# Patient Record
Sex: FEMALE | Race: WHITE | NOT HISPANIC OR LATINO
[De-identification: names, ages, dates, MRNs, and addresses within clinical notes are randomized per-mention and may not be internally consistent; named-entity substitution may affect disease eponyms.]

---

## 2017-05-09 PROBLEM — Z00.00 ENCOUNTER FOR PREVENTIVE HEALTH EXAMINATION: Status: ACTIVE | Noted: 2017-05-09

## 2017-05-10 ENCOUNTER — APPOINTMENT (OUTPATIENT)
Dept: INTERNAL MEDICINE | Facility: CLINIC | Age: 72
End: 2017-05-10

## 2017-05-17 ENCOUNTER — APPOINTMENT (OUTPATIENT)
Dept: INTERNAL MEDICINE | Facility: CLINIC | Age: 72
End: 2017-05-17

## 2017-05-17 ENCOUNTER — NON-APPOINTMENT (OUTPATIENT)
Age: 72
End: 2017-05-17

## 2017-05-17 VITALS
TEMPERATURE: 98.6 F | BODY MASS INDEX: 18.14 KG/M2 | HEIGHT: 62 IN | SYSTOLIC BLOOD PRESSURE: 138 MMHG | HEART RATE: 106 BPM | DIASTOLIC BLOOD PRESSURE: 86 MMHG | OXYGEN SATURATION: 95 % | WEIGHT: 98.6 LBS | RESPIRATION RATE: 18 BRPM

## 2017-05-30 ENCOUNTER — OTHER (OUTPATIENT)
Age: 72
End: 2017-05-30

## 2017-05-30 LAB
CHOLEST SERPL-MCNC: 203
HBA1C MFR BLD HPLC: 6
HDLC SERPL-MCNC: 72
LDLC SERPL CALC-MCNC: 115

## 2017-06-06 ENCOUNTER — APPOINTMENT (OUTPATIENT)
Dept: INTERNAL MEDICINE | Facility: CLINIC | Age: 72
End: 2017-06-06

## 2017-06-06 VITALS
RESPIRATION RATE: 20 BRPM | DIASTOLIC BLOOD PRESSURE: 76 MMHG | BODY MASS INDEX: 23 KG/M2 | OXYGEN SATURATION: 95 % | HEIGHT: 62 IN | SYSTOLIC BLOOD PRESSURE: 122 MMHG | WEIGHT: 125 LBS | HEART RATE: 108 BPM | TEMPERATURE: 98.9 F

## 2017-06-06 DIAGNOSIS — R06.09 OTHER FORMS OF DYSPNEA: ICD-10-CM

## 2017-08-14 ENCOUNTER — RECORD ABSTRACTING (OUTPATIENT)
Age: 72
End: 2017-08-14

## 2017-08-14 ENCOUNTER — APPOINTMENT (OUTPATIENT)
Dept: INTERNAL MEDICINE | Facility: CLINIC | Age: 72
End: 2017-08-14
Payer: MEDICARE

## 2017-08-14 VITALS
HEIGHT: 63 IN | BODY MASS INDEX: 21.97 KG/M2 | WEIGHT: 124 LBS | OXYGEN SATURATION: 94 % | HEART RATE: 88 BPM | RESPIRATION RATE: 16 BRPM | TEMPERATURE: 98.1 F | DIASTOLIC BLOOD PRESSURE: 80 MMHG | SYSTOLIC BLOOD PRESSURE: 112 MMHG

## 2017-08-14 DIAGNOSIS — F17.200 NICOTINE DEPENDENCE, UNSPECIFIED, UNCOMPLICATED: ICD-10-CM

## 2017-08-14 DIAGNOSIS — J18.9 PNEUMONIA, UNSPECIFIED ORGANISM: ICD-10-CM

## 2017-08-14 DIAGNOSIS — I77.9 DISORDER OF ARTERIES AND ARTERIOLES, UNSPECIFIED: ICD-10-CM

## 2017-08-14 DIAGNOSIS — E78.00 PURE HYPERCHOLESTEROLEMIA, UNSPECIFIED: ICD-10-CM

## 2017-08-14 DIAGNOSIS — R05 COUGH: ICD-10-CM

## 2017-08-14 DIAGNOSIS — J44.9 CHRONIC OBSTRUCTIVE PULMONARY DISEASE, UNSPECIFIED: ICD-10-CM

## 2017-08-14 PROCEDURE — 99214 OFFICE O/P EST MOD 30 MIN: CPT

## 2017-08-17 ENCOUNTER — APPOINTMENT (OUTPATIENT)
Dept: INTERNAL MEDICINE | Facility: CLINIC | Age: 72
End: 2017-08-17
Payer: MEDICARE

## 2017-08-17 VITALS
TEMPERATURE: 98.3 F | HEIGHT: 62 IN | RESPIRATION RATE: 16 BRPM | DIASTOLIC BLOOD PRESSURE: 80 MMHG | BODY MASS INDEX: 23 KG/M2 | SYSTOLIC BLOOD PRESSURE: 112 MMHG | OXYGEN SATURATION: 95 % | HEART RATE: 74 BPM | WEIGHT: 125 LBS

## 2017-08-17 DIAGNOSIS — M81.0 AGE-RELATED OSTEOPOROSIS W/OUT CURRENT PATHOLOGICAL FRACTURE: ICD-10-CM

## 2017-08-17 DIAGNOSIS — L40.0 PSORIASIS VULGARIS: ICD-10-CM

## 2017-08-17 DIAGNOSIS — K59.00 CONSTIPATION, UNSPECIFIED: ICD-10-CM

## 2017-08-17 PROCEDURE — 99214 OFFICE O/P EST MOD 30 MIN: CPT

## 2017-08-19 ENCOUNTER — INPATIENT (INPATIENT)
Facility: HOSPITAL | Age: 72
LOS: 4 days | Discharge: NURSING, REHAB OR LONG-TERM CARE | End: 2017-08-24
Attending: GENERAL PRACTICE | Admitting: GENERAL PRACTICE
Payer: MEDICARE

## 2017-08-19 ENCOUNTER — EMERGENCY (EMERGENCY)
Facility: HOSPITAL | Age: 72
LOS: 0 days | Discharge: HOME | End: 2017-08-19
Attending: EMERGENCY MEDICINE | Admitting: EMERGENCY MEDICINE
Payer: MEDICARE

## 2017-08-19 VITALS — HEIGHT: 62 IN | WEIGHT: 125 LBS

## 2017-08-19 VITALS
RESPIRATION RATE: 18 BRPM | HEART RATE: 91 BPM | DIASTOLIC BLOOD PRESSURE: 72 MMHG | SYSTOLIC BLOOD PRESSURE: 119 MMHG | TEMPERATURE: 98 F | OXYGEN SATURATION: 97 %

## 2017-08-19 VITALS — WEIGHT: 125 LBS | HEIGHT: 62 IN

## 2017-08-19 DIAGNOSIS — K59.00 CONSTIPATION, UNSPECIFIED: ICD-10-CM

## 2017-08-19 DIAGNOSIS — L40.9 PSORIASIS, UNSPECIFIED: ICD-10-CM

## 2017-08-19 DIAGNOSIS — R10.9 UNSPECIFIED ABDOMINAL PAIN: ICD-10-CM

## 2017-08-19 PROCEDURE — 99284 EMERGENCY DEPT VISIT MOD MDM: CPT

## 2017-08-19 PROCEDURE — 74177 CT ABD & PELVIS W/CONTRAST: CPT | Mod: 26

## 2017-08-19 PROCEDURE — 99285 EMERGENCY DEPT VISIT HI MDM: CPT | Mod: 25

## 2017-08-19 PROCEDURE — 74022 RADEX COMPL AQT ABD SERIES: CPT | Mod: 26

## 2017-08-19 RX ORDER — PIPERACILLIN AND TAZOBACTAM 4; .5 G/20ML; G/20ML
3.38 INJECTION, POWDER, LYOPHILIZED, FOR SOLUTION INTRAVENOUS ONCE
Qty: 0 | Refills: 0 | Status: COMPLETED | OUTPATIENT
Start: 2017-08-19 | End: 2017-08-20

## 2017-08-19 RX ORDER — ONDANSETRON 8 MG/1
4 TABLET, FILM COATED ORAL ONCE
Qty: 0 | Refills: 0 | Status: COMPLETED | OUTPATIENT
Start: 2017-08-19 | End: 2017-08-19

## 2017-08-19 RX ORDER — SODIUM CHLORIDE 9 MG/ML
500 INJECTION INTRAMUSCULAR; INTRAVENOUS; SUBCUTANEOUS ONCE
Qty: 0 | Refills: 0 | Status: COMPLETED | OUTPATIENT
Start: 2017-08-19 | End: 2017-08-19

## 2017-08-19 RX ORDER — MULTIVIT WITH MIN/MFOLATE/K2 340-15/3 G
300 POWDER (GRAM) ORAL ONCE
Qty: 0 | Refills: 0 | Status: COMPLETED | OUTPATIENT
Start: 2017-08-19 | End: 2017-08-19

## 2017-08-19 RX ADMIN — Medication 300 MILLILITER(S): at 11:35

## 2017-08-19 RX ADMIN — SODIUM CHLORIDE 500 MILLILITER(S): 9 INJECTION INTRAMUSCULAR; INTRAVENOUS; SUBCUTANEOUS at 18:15

## 2017-08-19 RX ADMIN — ONDANSETRON 4 MILLIGRAM(S): 8 TABLET, FILM COATED ORAL at 18:21

## 2017-08-19 NOTE — ED STATDOCS - OBJECTIVE STATEMENT
72F w/ no significant pmhx presents to ED c/o lack of BM for the last 7 days. Pt reports going to PMD who determined she is having constipation after rectal exam. Pt has no other complaints and denies SOB, CP, fever/chills, and HA. Denies hematuria, dysuria.

## 2017-08-19 NOTE — ED STATDOCS - ATTENDING CONTRIBUTION TO CARE
I, Will Rocha, performed the initial face to face bedside interview with this patient regarding history of present illness, review of symptoms and relevant past medical, social and family history.  I completed an independent physical examination.  I was the initial provider who evaluated this patient. I have signed out the follow up of any pending tests (i.e. labs, radiological studies) to the ACP.  I have communicated the patient’s plan of care and disposition with the ACP.  The history, relevant review of systems, past medical and surgical history, medical decision making, and physical examination was documented by the scribe in my presence and I attest to the accuracy of the documentation.

## 2017-08-19 NOTE — ED ADULT NURSE NOTE - OBJECTIVE STATEMENT
pt presents to ED with constipation. pt states last BM was 7 days ago. pt states she was seen and DC'ed from ED this morning. pt states she drank mag citrate, and then vomited. pt c.o nausea and mild lower abd cramping. afebrile. breathing si even and unlabored. a&ox3. will continue to monitor and reassess.

## 2017-08-19 NOTE — ED STATDOCS - CARE PLAN
Principal Discharge DX:	Metastatic colon cancer in female  Secondary Diagnosis:	Large bowel obstruction

## 2017-08-19 NOTE — ED PROVIDER NOTE - CARE PLAN
Principal Discharge DX:	Large bowel obstruction  Secondary Diagnosis:	Metastatic colon cancer in female

## 2017-08-19 NOTE — ED STATDOCS - PROGRESS NOTE DETAILS
TOSHA Boudreaux:  [facility]. She presented with c/o constipation for 6 days, tried OTC stool softners, went to see her PMD and was given fleet enema without any improvement.   I performed a CARRIE and there is no stool in the anal vault. Will check AXR.

## 2017-08-19 NOTE — ED STATDOCS - MEDICAL DECISION MAKING DETAILS
72F w/ no significant pmhx presents to ED c/o lack of BM for the last 7 days. Plan rectal exam and magnesium citrate.

## 2017-08-19 NOTE — ED ADULT NURSE REASSESSMENT NOTE - NS ED NURSE REASSESS COMMENT FT1
16FR NGT placed as ordered by Dr. Cabrera. placement checked and brownish drainage noted. Pt tolerated the procedure well. All needs are met and safety maintained. Will continue to monitor.

## 2017-08-19 NOTE — ED PROVIDER NOTE - OBJECTIVE STATEMENT
73 y/o female presents to the ED per MD if mag citrate made her vomit.  PT states her vomit was brown and has not eating in 2 days. No other presenting symptoms.

## 2017-08-19 NOTE — ED ADULT NURSE NOTE - CHPI ED SYMPTOMS NEG
no vomiting/no chills/no diarrhea/no dysuria/no fever/no blood in stool/no hematuria/no nausea/no burning urination/no abdominal distension

## 2017-08-19 NOTE — ED PROVIDER NOTE - PROGRESS NOTE DETAILS
TOSHA Boudreaux:  Pt seen and examined by intake provider. she was seen earlier for constipation but refused labs and CT at that time. she returned with vomiting and constipation and agreed to have labs and CT scan. Will follow.

## 2017-08-19 NOTE — ED ADULT TRIAGE NOTE - CHIEF COMPLAINT QUOTE
constipation, no BM x 7 days.  pt was seen here today and given mag citrate to drink, returns because she vomited after drinking mag citrate

## 2017-08-20 ENCOUNTER — TRANSCRIPTION ENCOUNTER (OUTPATIENT)
Age: 72
End: 2017-08-20

## 2017-08-20 ENCOUNTER — RESULT REVIEW (OUTPATIENT)
Age: 72
End: 2017-08-20

## 2017-08-20 DIAGNOSIS — C78.5 SECONDARY MALIGNANT NEOPLASM OF LARGE INTESTINE AND RECTUM: ICD-10-CM

## 2017-08-20 DIAGNOSIS — K56.60 UNSPECIFIED INTESTINAL OBSTRUCTION: ICD-10-CM

## 2017-08-20 LAB
ABO RH CONFIRMATION: SIGNIFICANT CHANGE UP
ADD ON TEST-SPECIMEN IN LAB: SIGNIFICANT CHANGE UP
ALBUMIN SERPL ELPH-MCNC: 3.7 G/DL — SIGNIFICANT CHANGE UP (ref 3.3–5)
ALP SERPL-CCNC: 142 U/L — HIGH (ref 40–120)
ALT FLD-CCNC: 28 U/L — SIGNIFICANT CHANGE UP (ref 12–78)
ANION GAP SERPL CALC-SCNC: 12 MMOL/L — SIGNIFICANT CHANGE UP (ref 5–17)
AST SERPL-CCNC: 24 U/L — SIGNIFICANT CHANGE UP (ref 15–37)
BASOPHILS # BLD AUTO: 0 K/UL — SIGNIFICANT CHANGE UP (ref 0–0.2)
BASOPHILS NFR BLD AUTO: 0.2 % — SIGNIFICANT CHANGE UP (ref 0–2)
BILIRUB SERPL-MCNC: 0.7 MG/DL — SIGNIFICANT CHANGE UP (ref 0.2–1.2)
BLD GP AB SCN SERPL QL: SIGNIFICANT CHANGE UP
BUN SERPL-MCNC: 27 MG/DL — HIGH (ref 7–23)
CALCIUM SERPL-MCNC: 9.6 MG/DL — SIGNIFICANT CHANGE UP (ref 8.5–10.1)
CHLORIDE SERPL-SCNC: 98 MMOL/L — SIGNIFICANT CHANGE UP (ref 96–108)
CO2 SERPL-SCNC: 25 MMOL/L — SIGNIFICANT CHANGE UP (ref 22–31)
CREAT SERPL-MCNC: 0.76 MG/DL — SIGNIFICANT CHANGE UP (ref 0.5–1.3)
EOSINOPHIL # BLD AUTO: 0 K/UL — SIGNIFICANT CHANGE UP (ref 0–0.5)
EOSINOPHIL NFR BLD AUTO: 0.1 % — SIGNIFICANT CHANGE UP (ref 0–6)
GLUCOSE SERPL-MCNC: 134 MG/DL — HIGH (ref 70–99)
HCT VFR BLD CALC: 48.2 % — HIGH (ref 34.5–45)
HGB BLD-MCNC: 16.4 G/DL — HIGH (ref 11.5–15.5)
INR BLD: 1.09 RATIO — SIGNIFICANT CHANGE UP (ref 0.88–1.16)
LIDOCAIN IGE QN: 141 U/L — SIGNIFICANT CHANGE UP (ref 73–393)
LYMPHOCYTES # BLD AUTO: 1.1 K/UL — SIGNIFICANT CHANGE UP (ref 1–3.3)
LYMPHOCYTES # BLD AUTO: 10.8 % — LOW (ref 13–44)
MAGNESIUM SERPL-MCNC: 2.8 MG/DL — HIGH (ref 1.6–2.6)
MCHC RBC-ENTMCNC: 31.2 PG — SIGNIFICANT CHANGE UP (ref 27–34)
MCHC RBC-ENTMCNC: 34.1 GM/DL — SIGNIFICANT CHANGE UP (ref 32–36)
MCV RBC AUTO: 91.7 FL — SIGNIFICANT CHANGE UP (ref 80–100)
MONOCYTES # BLD AUTO: 0.5 K/UL — SIGNIFICANT CHANGE UP (ref 0–0.9)
MONOCYTES NFR BLD AUTO: 4.9 % — SIGNIFICANT CHANGE UP (ref 2–14)
NEUTROPHILS # BLD AUTO: 8.9 K/UL — HIGH (ref 1.8–7.4)
NEUTROPHILS NFR BLD AUTO: 84.1 % — HIGH (ref 43–77)
PLATELET # BLD AUTO: 258 K/UL — SIGNIFICANT CHANGE UP (ref 150–400)
POTASSIUM SERPL-MCNC: 4.3 MMOL/L — SIGNIFICANT CHANGE UP (ref 3.5–5.3)
POTASSIUM SERPL-SCNC: 4.3 MMOL/L — SIGNIFICANT CHANGE UP (ref 3.5–5.3)
PROT SERPL-MCNC: 7.5 GM/DL — SIGNIFICANT CHANGE UP (ref 6–8.3)
PROTHROM AB SERPL-ACNC: 11.8 SEC — SIGNIFICANT CHANGE UP (ref 9.8–12.7)
RBC # BLD: 5.26 M/UL — HIGH (ref 3.8–5.2)
RBC # FLD: 11.7 % — SIGNIFICANT CHANGE UP (ref 10.3–14.5)
SODIUM SERPL-SCNC: 135 MMOL/L — SIGNIFICANT CHANGE UP (ref 135–145)
TYPE + AB SCN PNL BLD: SIGNIFICANT CHANGE UP
WBC # BLD: 10.6 K/UL — HIGH (ref 3.8–10.5)
WBC # FLD AUTO: 10.6 K/UL — HIGH (ref 3.8–10.5)

## 2017-08-20 PROCEDURE — 88341 IMHCHEM/IMCYTCHM EA ADD ANTB: CPT | Mod: 26

## 2017-08-20 PROCEDURE — 88342 IMHCHEM/IMCYTCHM 1ST ANTB: CPT | Mod: 26

## 2017-08-20 PROCEDURE — 93010 ELECTROCARDIOGRAM REPORT: CPT

## 2017-08-20 PROCEDURE — 88307 TISSUE EXAM BY PATHOLOGIST: CPT | Mod: 26

## 2017-08-20 RX ORDER — HYDROMORPHONE HYDROCHLORIDE 2 MG/ML
0.5 INJECTION INTRAMUSCULAR; INTRAVENOUS; SUBCUTANEOUS
Qty: 0 | Refills: 0 | Status: DISCONTINUED | OUTPATIENT
Start: 2017-08-20 | End: 2017-08-21

## 2017-08-20 RX ORDER — ENOXAPARIN SODIUM 100 MG/ML
40 INJECTION SUBCUTANEOUS EVERY 24 HOURS
Qty: 0 | Refills: 0 | Status: DISCONTINUED | OUTPATIENT
Start: 2017-08-20 | End: 2017-08-24

## 2017-08-20 RX ORDER — SODIUM CHLORIDE 9 MG/ML
1000 INJECTION INTRAMUSCULAR; INTRAVENOUS; SUBCUTANEOUS
Qty: 0 | Refills: 0 | Status: DISCONTINUED | OUTPATIENT
Start: 2017-08-20 | End: 2017-08-21

## 2017-08-20 RX ORDER — ONDANSETRON 8 MG/1
4 TABLET, FILM COATED ORAL EVERY 6 HOURS
Qty: 0 | Refills: 0 | Status: DISCONTINUED | OUTPATIENT
Start: 2017-08-20 | End: 2017-08-24

## 2017-08-20 RX ORDER — CEFOTETAN DISODIUM 1 G
2 VIAL (EA) INJECTION ONCE
Qty: 0 | Refills: 0 | Status: DISCONTINUED | OUTPATIENT
Start: 2017-08-20 | End: 2017-08-20

## 2017-08-20 RX ORDER — SODIUM CHLORIDE 9 MG/ML
1000 INJECTION INTRAMUSCULAR; INTRAVENOUS; SUBCUTANEOUS
Qty: 0 | Refills: 0 | Status: DISCONTINUED | OUTPATIENT
Start: 2017-08-20 | End: 2017-08-20

## 2017-08-20 RX ORDER — MEPERIDINE HYDROCHLORIDE 50 MG/ML
12.5 INJECTION INTRAMUSCULAR; INTRAVENOUS; SUBCUTANEOUS
Qty: 0 | Refills: 0 | Status: DISCONTINUED | OUTPATIENT
Start: 2017-08-20 | End: 2017-08-20

## 2017-08-20 RX ORDER — CEFOTETAN DISODIUM 1 G
2 VIAL (EA) INJECTION EVERY 12 HOURS
Qty: 0 | Refills: 0 | Status: COMPLETED | OUTPATIENT
Start: 2017-08-20 | End: 2017-08-20

## 2017-08-20 RX ORDER — ONDANSETRON 8 MG/1
4 TABLET, FILM COATED ORAL ONCE
Qty: 0 | Refills: 0 | Status: DISCONTINUED | OUTPATIENT
Start: 2017-08-20 | End: 2017-08-20

## 2017-08-20 RX ORDER — NALOXONE HYDROCHLORIDE 4 MG/.1ML
0.1 SPRAY NASAL
Qty: 0 | Refills: 0 | Status: DISCONTINUED | OUTPATIENT
Start: 2017-08-20 | End: 2017-08-24

## 2017-08-20 RX ORDER — MORPHINE SULFATE 50 MG/1
4 CAPSULE, EXTENDED RELEASE ORAL EVERY 4 HOURS
Qty: 0 | Refills: 0 | Status: DISCONTINUED | OUTPATIENT
Start: 2017-08-20 | End: 2017-08-24

## 2017-08-20 RX ORDER — NICOTINE POLACRILEX 2 MG
1 GUM BUCCAL DAILY
Qty: 0 | Refills: 0 | Status: DISCONTINUED | OUTPATIENT
Start: 2017-08-20 | End: 2017-08-20

## 2017-08-20 RX ORDER — HYDROMORPHONE HYDROCHLORIDE 2 MG/ML
30 INJECTION INTRAMUSCULAR; INTRAVENOUS; SUBCUTANEOUS
Qty: 0 | Refills: 0 | Status: DISCONTINUED | OUTPATIENT
Start: 2017-08-20 | End: 2017-08-21

## 2017-08-20 RX ADMIN — Medication 100 GRAM(S): at 18:53

## 2017-08-20 RX ADMIN — Medication 100 GRAM(S): at 11:34

## 2017-08-20 RX ADMIN — HYDROMORPHONE HYDROCHLORIDE 30 MILLILITER(S): 2 INJECTION INTRAMUSCULAR; INTRAVENOUS; SUBCUTANEOUS at 05:09

## 2017-08-20 RX ADMIN — ENOXAPARIN SODIUM 40 MILLIGRAM(S): 100 INJECTION SUBCUTANEOUS at 11:35

## 2017-08-20 RX ADMIN — PIPERACILLIN AND TAZOBACTAM 200 GRAM(S): 4; .5 INJECTION, POWDER, LYOPHILIZED, FOR SOLUTION INTRAVENOUS at 01:31

## 2017-08-20 RX ADMIN — SODIUM CHLORIDE 100 MILLILITER(S): 9 INJECTION INTRAMUSCULAR; INTRAVENOUS; SUBCUTANEOUS at 05:10

## 2017-08-20 NOTE — DISCHARGE NOTE ADULT - HOSPITAL COURSE
73 yo fem admitted with obstructed splenic flexure mass with liver metastasis, patient underwent an end colostomy with mucous fistula, liver mass biopsy, did well after surgery

## 2017-08-20 NOTE — H&P ADULT - NSHPREVIEWOFSYSTEMS_GEN_ALL_CORE
REVIEW OF SYSTEMS:    CONSTITUTIONAL: No weakness, fevers or chills  EYES/ENT: No visual changes;  No vertigo or throat pain   NECK: No pain or stiffness  RESPIRATORY: No cough, wheezing, hemoptysis; No shortness of breath  CARDIOVASCULAR: No chest pain or palpitations  GASTROINTESTINAL: abdominal  pain. nausea, vomiting, s; No diarrhea or constipation. No melena or hematochezia.obstipation  GENITOURINARY: No dysuria, frequency or hematuria  NEUROLOGICAL: No numbness or weakness  SKIN: No itching, burning, rashes, or lesions   All other review of systems is negative unless indicated above.

## 2017-08-20 NOTE — DISCHARGE NOTE ADULT - PLAN OF CARE
Return to normal activities in 2-3 weeks Follow up appt 8/29, 9:30 am. Blue house in corner, parking in rear. Follow up appt Dr Sneed 8/29, 9:30 am. Blue house in corner, parking in rear.    Call DR Desouza to make appt in 2 weeks

## 2017-08-20 NOTE — BRIEF OPERATIVE NOTE - PROCEDURE
Colostomy, end transverse  08/20/2017  end transverse colostomy, mucuos fistula, liver mass  biopsy  Active  YING

## 2017-08-20 NOTE — CONSULT NOTE ADULT - SUBJECTIVE AND OBJECTIVE BOX
Patient is a 72y old  Female who presents with a chief complaint of Abd pain and was found to have colonic obstruction due to a splenic flexure mass      HPI:  73 yo fem with abdominal distention, no Bms x 7 days, patient came earlier to ER was given laxative and was sent home. Patient returned vomiting. CT Abd showed obstructive splenic flexure mass with multiple liver mets. Has never had a colonoscopy  she is now s/p a diverting colostomy.    liver biopsy taken     the primary lesion in the splenic flexure was fixed and could not be removed.      PAST MEDICAL & SURGICAL HISTORY:  Psoriasis  No significant past surgical history      REVIEW OF SYSTEMS    General:	The patient is post op    no unexpected weight loss. Appetite good. no night sweats.  Skin: no Rash.	  ENT: no sore throat or oral pain. no difficulty with swallowing  Respiratory: No chest pain, No shortness of breath, no hemoptysis, no cough, no chest pain.  Cardiovascular : No chest pain. no palpitation.  Gastrointestinal:  post op pain  Genitourinary: No hematuria , no dysuria	  Musculoskeletal: No myalgia, no arthralgia, no back pain, no joint swelling  Neurological: no headaches, no dizzyness, no weakness, no double vision. 	  Psychiatric: no change in mood. 	  Hematology/Lymphatics: no weakness. 	  Endocrine:	no burning in urine or frequency  Allergic/Immunologic:	 no fever.     Allergies    No Known Allergies    Intolerances        Occupation:  [employer]: Denied Smoking: Denied Drug Use: Denied  Marital Status:         FAMILY HISTORY:      MEDICATIONS  (STANDING):  HYDROmorphone PCA (1 mG/mL) 30 milliLiter(s) PCA Continuous PCA Continuous  enoxaparin Injectable 40 milliGRAM(s) SubCutaneous every 24 hours  sodium chloride 0.9%. 1000 milliLiter(s) (100 mL/Hr) IV Continuous <Continuous>  cefoTEtan  IVPB 2 Gram(s) IV Intermittent every 12 hours    MEDICATIONS  (PRN):  HYDROmorphone PCA (1 mG/mL) Rescue Clinician Bolus 0.5 milliGRAM(s) IV Push every 15 minutes PRN for Pain Scale GREATER THAN 6  naloxone Injectable 0.1 milliGRAM(s) IV Push every 3 minutes PRN For ANY of the following changes in patient status:  A. RR LESS THAN 10 breaths per minute, B. Oxygen saturation LESS THAN 90%, C. Sedation score of 6  ondansetron Injectable 4 milliGRAM(s) IV Push every 6 hours PRN Nausea  ondansetron Injectable 4 milliGRAM(s) IV Push every 6 hours PRN Nausea  morphine  - Injectable 4 milliGRAM(s) IV Push every 4 hours PRN Moderate Pain (4 - 6)      PHYSICAL EXAM:  Vital Signs Last 24 Hrs  T(C): 36.3 (20 Aug 2017 05:57), Max: 36.8 (19 Aug 2017 17:32)  T(F): 97.3 (20 Aug 2017 05:57), Max: 98.2 (19 Aug 2017 17:32)  HR: 67 (20 Aug 2017 05:57) (60 - 97)  BP: 110/68 (20 Aug 2017 05:57) (105/63 - 134/79)  BP(mean): --  RR: 12 (20 Aug 2017 05:57) (11 - 17)  SpO2: 98% (20 Aug 2017 05:57) (98% - 100%)  Gen: well developed, well nourished, comfortable  HEENT: normocephalic/atraumatic, no conjunctival pallor, no scleral icterus, no oral thrush/mucosal bleeding/mucositis  Neck: supple, no masses, no JVD  Breasts: deferred  Back: nontender  Cardiovascular: heart shounds Normal S1S2, no murmurs/rubs/gallops  Respiratory: air entry normal and equal on both sides. no wheeze, no ronchi,   Gastrointestinal: BS+, soft, NT/ND, no masses, no splenomegaly, no hepatomegaly,   no evidence for ascites  Genitourinary: deferred  Rectal: deferred  Extremities: no clubbing/cyanosis, no edema, no calf tenderness  Neurological:  Alert oriented X3 cranial nerves normal. no focal deficits  Skin: no rash on visible skin  Lymph Nodes:  no cervical/supraclavicular LAD, no axillary/groin LAD  Musculoskeletal:  full ROM  Psychiatric:  mood appears normal. not obviously anxious or depressed.        LABS:                        16.4   10.6  )-----------( 258      ( 19 Aug 2017 18:15 )             48.2     19 Aug 2017 18:15    135    |  98     |  27     ----------------------------<  134    4.3     |  25     |  0.76     Ca    9.6        19 Aug 2017 18:15  Mg     2.8       19 Aug 2017 18:15    TPro  7.5    /  Alb  3.7    /  TBili  0.7    /  DBili  x      /  AST  24     /  ALT  28     /  AlkPhos  142    19 Aug 2017 18:15    LIVER FUNCTIONS - ( 19 Aug 2017 18:15 )  Alb: 3.7 g/dL / Pro: 7.5 gm/dL / ALK PHOS: 142 U/L / ALT: 28 U/L / AST: 24 U/L / GGT: x           PT/INR - ( 19 Aug 2017 18:15 )   PT: 11.8 sec;   INR: 1.09 ratio             RADIOLOGY & ADDITIONAL STUDIES: Patient is a 72y old  Female who presents with a chief complaint of Abd pain and was found to have colonic obstruction due to a splenic flexure mass      HPI:  71 yo fem with abdominal distention, no Bms x 7 days, patient came earlier to ER was given laxative and was sent home. Patient returned vomiting. CT Abd showed obstructive splenic flexure mass with multiple liver mets. Has never had a colonoscopy  she is now s/p a diverting colostomy.    liver biopsy taken     the primary lesion in the splenic flexure was fixed and could not be removed.      PAST MEDICAL & SURGICAL HISTORY:  Psoriasis  No significant past surgical history      REVIEW OF SYSTEMS    General:	The patient is post op  appears tired  no unexpected weight loss.  Skin: no Rash.	  ENT: no sore throat or oral pain. no difficulty with swallowing  Respiratory: No chest pain, No shortness of breath, no hemoptysis, no cough, no chest pain.  Cardiovascular : No chest pain. no palpitation.  Gastrointestinal:  post op pain  Genitourinary: No hematuria , no dysuria	  Musculoskeletal: No myalgia, no arthralgia, no back pain, no joint swelling  Neurological: no headaches, no dizzyness,+weakness, no double vision. 	  Psychiatric: no change in mood. 	  Hematology/Lymphatics: no weakness. 	  Endocrine:	no burning in urine or frequency  Allergic/Immunologic:	 no fever.     Allergies    No Known Allergies    Intolerances        Occupation:  [employer]: Denied Smoking: Denied Drug Use: Denied  Marital Status:         FAMILY HISTORY:      MEDICATIONS  (STANDING):  HYDROmorphone PCA (1 mG/mL) 30 milliLiter(s) PCA Continuous PCA Continuous  enoxaparin Injectable 40 milliGRAM(s) SubCutaneous every 24 hours  sodium chloride 0.9%. 1000 milliLiter(s) (100 mL/Hr) IV Continuous <Continuous>  cefoTEtan  IVPB 2 Gram(s) IV Intermittent every 12 hours    MEDICATIONS  (PRN):  HYDROmorphone PCA (1 mG/mL) Rescue Clinician Bolus 0.5 milliGRAM(s) IV Push every 15 minutes PRN for Pain Scale GREATER THAN 6  naloxone Injectable 0.1 milliGRAM(s) IV Push every 3 minutes PRN For ANY of the following changes in patient status:  A. RR LESS THAN 10 breaths per minute, B. Oxygen saturation LESS THAN 90%, C. Sedation score of 6  ondansetron Injectable 4 milliGRAM(s) IV Push every 6 hours PRN Nausea  ondansetron Injectable 4 milliGRAM(s) IV Push every 6 hours PRN Nausea  morphine  - Injectable 4 milliGRAM(s) IV Push every 4 hours PRN Moderate Pain (4 - 6)      PHYSICAL EXAM:  Vital Signs Last 24 Hrs  T(C): 36.3 (20 Aug 2017 05:57), Max: 36.8 (19 Aug 2017 17:32)  T(F): 97.3 (20 Aug 2017 05:57), Max: 98.2 (19 Aug 2017 17:32)  HR: 67 (20 Aug 2017 05:57) (60 - 97)  BP: 110/68 (20 Aug 2017 05:57) (105/63 - 134/79)  BP(mean): --  RR: 12 (20 Aug 2017 05:57) (11 - 17)  SpO2: 98% (20 Aug 2017 05:57) (98% - 100%)      Gen: post op   uncomfortable  HEENT: normocephalic/atraumatic, no conjunctival pallor, no scleral icterus, no oral thrush/mucosal bleeding/mucositis  Neck: supple, no masses, no JVD  Breasts: deferred  Back: nontender  Cardiovascular: heart shounds Normal S1S2, no murmurs/rubs/gallops  Respiratory: air entry normal and equal on both sides. no wheeze, no ronchi,   Gastrointestinal: colostomy functioning well   no evidence for ascites  Genitourinary: deferred  Rectal: deferred  Extremities: no clubbing/cyanosis, no edema, no calf tenderness  Neurological:  Alert oriented X3 cranial nerves normal. no focal deficits  Skin: no rash on visible skin  Lymph Nodes:  no cervical/supraclavicular LAD, no axillary/groin LAD  Musculoskeletal:  full ROM  Psychiatric:  mood appears normal.+ anxious or depressed.        LABS:                        16.4   10.6  )-----------( 258      ( 19 Aug 2017 18:15 )             48.2     19 Aug 2017 18:15    135    |  98     |  27     ----------------------------<  134    4.3     |  25     |  0.76     Ca    9.6        19 Aug 2017 18:15  Mg     2.8       19 Aug 2017 18:15    TPro  7.5    /  Alb  3.7    /  TBili  0.7    /  DBili  x      /  AST  24     /  ALT  28     /  AlkPhos  142    19 Aug 2017 18:15    LIVER FUNCTIONS - ( 19 Aug 2017 18:15 )  Alb: 3.7 g/dL / Pro: 7.5 gm/dL / ALK PHOS: 142 U/L / ALT: 28 U/L / AST: 24 U/L / GGT: x           PT/INR - ( 19 Aug 2017 18:15 )   PT: 11.8 sec;   INR: 1.09 ratio      Path pending.      RADIOLOGY & ADDITIONAL STUDIES:    multiple lever mets in the liver involving both lobes. splenic implant  colonic obstruction Patient is a 72y old  Female who presents with a chief complaint of Abd pain and was found to have colonic obstruction due to a splenic flexure mass      HPI:  71 yo fem with abdominal distention, no Bms x 7 days, patient came earlier to ER was given laxative and was sent home. Patient returned vomiting. CT Abd showed obstructive splenic flexure mass with multiple liver mets. Has never had a colonoscopy  she is now s/p a diverting colostomy.    liver biopsy taken     the primary lesion in the splenic flexure was fixed and could not be removed.      PAST MEDICAL & SURGICAL HISTORY:  Psoriasis  No significant past surgical history      REVIEW OF SYSTEMS    General:	The patient is post op  appears tired  no unexpected weight loss.  Skin: no Rash.	  ENT: no sore throat or oral pain. no difficulty with swallowing  Respiratory: No chest pain, No shortness of breath, no hemoptysis, no cough, no chest pain.  Cardiovascular : No chest pain. no palpitation.  Gastrointestinal:  post op pain  Genitourinary: No hematuria , no dysuria	  Musculoskeletal: No myalgia, no arthralgia, no back pain, no joint swelling  Neurological: no headaches, no dizzyness,+weakness, no double vision. 	  Psychiatric: no change in mood. 	  Hematology/Lymphatics: no weakness. 	  Endocrine:	no burning in urine or frequency  Allergic/Immunologic:	 no fever.     Allergies    No Known Allergies    Intolerances        Occupation:  [employer]: Denied Smoking: Denied Drug Use: Denied  Marital Status:         FAMILY HISTORY:      MEDICATIONS  (STANDING):  HYDROmorphone PCA (1 mG/mL) 30 milliLiter(s) PCA Continuous PCA Continuous  enoxaparin Injectable 40 milliGRAM(s) SubCutaneous every 24 hours  sodium chloride 0.9%. 1000 milliLiter(s) (100 mL/Hr) IV Continuous <Continuous>  cefoTEtan  IVPB 2 Gram(s) IV Intermittent every 12 hours    MEDICATIONS  (PRN):  HYDROmorphone PCA (1 mG/mL) Rescue Clinician Bolus 0.5 milliGRAM(s) IV Push every 15 minutes PRN for Pain Scale GREATER THAN 6  naloxone Injectable 0.1 milliGRAM(s) IV Push every 3 minutes PRN For ANY of the following changes in patient status:  A. RR LESS THAN 10 breaths per minute, B. Oxygen saturation LESS THAN 90%, C. Sedation score of 6  ondansetron Injectable 4 milliGRAM(s) IV Push every 6 hours PRN Nausea  ondansetron Injectable 4 milliGRAM(s) IV Push every 6 hours PRN Nausea  morphine  - Injectable 4 milliGRAM(s) IV Push every 4 hours PRN Moderate Pain (4 - 6)      PHYSICAL EXAM:  Vital Signs Last 24 Hrs  T(C): 36.3 (20 Aug 2017 05:57), Max: 36.8 (19 Aug 2017 17:32)  T(F): 97.3 (20 Aug 2017 05:57), Max: 98.2 (19 Aug 2017 17:32)  HR: 67 (20 Aug 2017 05:57) (60 - 97)  BP: 110/68 (20 Aug 2017 05:57) (105/63 - 134/79)  BP(mean): --  RR: 12 (20 Aug 2017 05:57) (11 - 17)  SpO2: 98% (20 Aug 2017 05:57) (98% - 100%)      Gen: post op   comfortable  HEENT: normocephalic/atraumatic, no conjunctival pallor, no scleral icterus, no oral thrush/mucosal bleeding/mucositis  Neck: supple, no masses, no JVD  Breasts: deferred  Back: nontender  Cardiovascular: heart sounds Normal S1S2, no murmurs/rubs/gallops  Respiratory: air entry normal and equal on both sides. no wheeze, no ronchi,   Gastrointestinal: colostomy functioning well   no evidence for ascites  Genitourinary: deferred  Rectal: deferred  Extremities: no clubbing/cyanosis, no edema, no calf tenderness  Neurological:  Alert oriented X3 cranial nerves normal. no focal deficits  Skin: no rash on visible skin  Lymph Nodes:  no cervical/supraclavicular LAD, no axillary/groin LAD  Musculoskeletal:  full ROM  Psychiatric:  mood appears normal.+ anxious or depressed.        LABS:                        16.4   10.6  )-----------( 258      ( 19 Aug 2017 18:15 )             48.2     19 Aug 2017 18:15    135    |  98     |  27     ----------------------------<  134    4.3     |  25     |  0.76     Ca    9.6        19 Aug 2017 18:15  Mg     2.8       19 Aug 2017 18:15    TPro  7.5    /  Alb  3.7    /  TBili  0.7    /  DBili  x      /  AST  24     /  ALT  28     /  AlkPhos  142    19 Aug 2017 18:15    LIVER FUNCTIONS - ( 19 Aug 2017 18:15 )  Alb: 3.7 g/dL / Pro: 7.5 gm/dL / ALK PHOS: 142 U/L / ALT: 28 U/L / AST: 24 U/L / GGT: x           PT/INR - ( 19 Aug 2017 18:15 )   PT: 11.8 sec;   INR: 1.09 ratio      Path pending.      RADIOLOGY & ADDITIONAL STUDIES:    multiple lever mets in the liver involving both lobes. splenic implant  colonic obstruction

## 2017-08-20 NOTE — DISCHARGE NOTE ADULT - CARE PLAN
Principal Discharge DX:	Colon cancer metastasized to liver  Goal:	Return to normal activities in 2-3 weeks  Instructions for follow-up, activity and diet:	Follow up appt 8/29, 9:30 am. Blue house in corner, parking in rear. Principal Discharge DX:	Colon cancer metastasized to liver  Goal:	Return to normal activities in 2-3 weeks  Instructions for follow-up, activity and diet:	Follow up appt Dr Sneed 8/29, 9:30 am. Blue house in corner, parking in rear.    Call DR Desouza to make appt in 2 weeks

## 2017-08-20 NOTE — H&P ADULT - NSHPLABSRESULTS_GEN_ALL_CORE
WBC 11  H/H 16/48        BUN 27  Creat 0.7      < from: CT Abdomen and Pelvis w/ Oral Cont and w/ IV Cont (08.19.17 @ 21:10) >    IMPRESSION:     Metastatic colon cancer with primary lesion of the splenic flexure.     Resultant large bowel obstruction with pneumatosis and mesenteric venous   gas adjacent to the ascending colon. Lactate level correlation   recommended.    Splenic and hepatic metastases.    < end of copied text >

## 2017-08-20 NOTE — DISCHARGE NOTE ADULT - PATIENT PORTAL LINK FT
“You can access the [facility] Patient Portal, offered by [facility], by registering with the following website: [email or web address]”

## 2017-08-20 NOTE — DISCHARGE NOTE ADULT - CONDITIONS AT DISCHARGE
Augmentin 875 mg 1 tab q12h x 7 days  Flagyl 500mg q8h 1 tab x 7 days Augmentin 875 mg 1 tab q12h x 7 days  Flagyl 500mg q8h 1 tab x 7 days  Dry dressing to cover left upper quadrant mucous fistula as needed

## 2017-08-20 NOTE — H&P ADULT - ASSESSMENT
73 yo fem with obstructive colon cancer at splenic flexure liver metastasis  -Risk /benefit of options given to patient including Ex lap/ left colectomy-colostomy vs extended right colectomy-ileostomy if cecum is necrotic, colostomy. Patient wants to have surgery, doesnt want hospice treatment at the moment  -IV cefotetan  -NPO  -IVF

## 2017-08-20 NOTE — H&P ADULT - HISTORY OF PRESENT ILLNESS
71 yo fem with abdominal distention, no Bms x 7 days, patient came earlier to ER was given laxative and was sent home. Patient returned vomiting. CT Abd showed obstructive splenic flexure mass with multiple liver mets. Has never had a colonoscopy.

## 2017-08-20 NOTE — CONSULT NOTE ADULT - ASSESSMENT
A/P:  72 y.o. female with no significant PMH presents with constipation and abdominal pain.     #abdominal pain, constipation found to have metastatic colon ca  #large bowel obstruction  -admit to surgery team  -per surgery, Dr Sneed, to have surgery tonight  -ABx, fluid per primary  -NPO  -consider oncology consult w/ Dr Sky    #tobacco use  -encourage smoking cessation  -nicotine patch daily    #dehydration/vomiting  -iv fluids  -antiemetics    #DVT ppx

## 2017-08-20 NOTE — PHYSICAL THERAPY INITIAL EVALUATION ADULT - PERTINENT HX OF CURRENT PROBLEM, REHAB EVAL
72F with abdominal distention, no Bms x 7 days, patient came earlier to ER was given laxative and was sent home. Patient returned vomiting. CT Abd showed obstructive splenic flexure mass with multiple liver mets.

## 2017-08-20 NOTE — ED POST DISCHARGE NOTE - RESULT SUMMARY
signed Hanna De La Rosa PA-C, radiology call back for possible partial bowel obstruction on xray. Left message for call back.

## 2017-08-20 NOTE — PHYSICAL THERAPY INITIAL EVALUATION ADULT - GENERAL OBSERVATIONS, REHAB EVAL
pt received supine in bed on 1N.  pt very upset about new cancer diagnosis. pt initially agreeable to PT, then refused OOB wanted to hold off until tomorrow.

## 2017-08-20 NOTE — H&P ADULT - NSHPPHYSICALEXAM_GEN_ALL_CORE
.  VITAL SIGNS:  T(C): 36.8 (08-19-17 @ 17:32), Max: 36.8 (08-19-17 @ 17:32)  T(F): 98.2 (08-19-17 @ 17:32), Max: 98.2 (08-19-17 @ 17:32)  HR: 97 (08-19-17 @ 17:32) (91 - 97)  BP: 126/80 (08-19-17 @ 17:32) (119/72 - 126/80)  BP(mean): --  RR: 16 (08-19-17 @ 17:32) (16 - 18)  SpO2: 98% (08-19-17 @ 17:32) (97% - 98%)  Wt(kg): --    PHYSICAL EXAM:    Constitutional: WDWN resting comfortably in bed; NAD  Head: NC/AT  Eyes: PERRL, EOMI, anicteric sclera  ENT: no nasal discharge; uvula midline, no oropharyngeal erythema or exudates; MMM  Neck: supple; no JVD or thyromegaly  Respiratory: CTA B/L; no W/R/R, no retractions  Cardiac: +S1/S2; RRR; no M/R/G; PMI non-displaced  Gastrointestinal: softly distended, some tenderness RLQ, no rebound  Genitourinary: normal external genitalia  Back: spine midline, no bony tenderness or step-offs; no CVAT B/L  Extremities: WWP, no clubbing or cyanosis; no peripheral edema  Musculoskeletal: NROM x4; no joint swelling, tenderness or erythema  Vascular: 2+ radial, femoral, DP/PT pulses B/L  Dermatologic: skin warm, dry and intact; no rashes, wounds, or scars  Lymphatic: no submandibular or cervical LAD  Neurologic: AAOx3; CNII-XII grossly intact; no focal deficits  Psychiatric: affect and characteristics of appearance, verbalizations, behaviors are appropriate

## 2017-08-20 NOTE — PHYSICAL THERAPY INITIAL EVALUATION ADULT - DIAGNOSIS, PT EVAL
obstructive colon cancer at splenic flexure liver metastasis s/p end colostomy, mucous fistula, liver mass biopsy on 8/20/17

## 2017-08-20 NOTE — CONSULT NOTE ADULT - PROBLEM SELECTOR RECOMMENDATION 9
shall FU path  shall eventually require chemotherapy depending on path, KRAS mutation and MSI status shall FU path  shall eventually require chemotherapy depending on path, KRAS mutation and MSI status  Rec:  FU with me in 3 weeks following discharge to discuss path and treatment options

## 2017-08-20 NOTE — CONSULT NOTE ADULT - SUBJECTIVE AND OBJECTIVE BOX
PCP/Pulm: Maldonado    72 y.o. female with no significant PMH presents with constipation and abdominal pain. Pt was in ED in AM, refused labs/imaging and was given mag citrate. Pt discharged, but coming back with vomiting. Pt denies fever, chills, CP, SOB. +constipation for the past week. States baseline ambulating, able to dance and performs all ADLs.     PMH: as above  PSH: denies  Social Hx: +tobacco, 1PPD x 60 years, 2 drinks per week, no drugs  Family Hx: denies cancer hx  ROS: per HPI    VITALS:  98.2   126/80   97   16   98%RA    GEN: NAD  HEENT: NC/AT, EOMI, NGT in place to suction  NECK: no JVD, thyroidmegaly  CVS: S1S2, no murmur, regular rate  PULM: CTAB, no wheezing, no rales, no rhonchi  ABD: mild tenderness lower quadrant, no guarding, no rebound  MUSCULOSKEL: no muscle tenderness  EXT: no edema  NEURO: AAOx3, CN II-XII grossly intact    LAB:  CBC: 10.6/16.4/258  BMP: 135  98  27      GLU: 134       4.3  25  0.76    ALT/AST/AP: 28/24/142    CT Abd/pelv:  IMPRESSION:     Metastatic colon cancer with primary lesion of the splenic flexure.     Resultant large bowel obstruction with pneumatosis and mesenteric venous gas adjacent to the ascending colon. Lactate level correlation   recommended.    Splenic and hepatic metastases.    A/P:  72 y.o. female with no significant PMH presents with constipation and abdominal pain.     #abdominal pain, constipation found to have metastatic colon ca  #large bowel obstruction  -admit to surgery team  -per surgery, Dr Sneed, to have surgery tonight  -ABx, fluid per primary  -NPO  -oncology consult    #tobacco use  -encourage smoking cessation  -nicotine patch daily    #dehydration/vomiting  -iv fluids  -antiemetics    #DVT ppx PCP/Pulm: Maldonado    72 y.o. female with PMH psoriasis presents with constipation and abdominal pain. Pt was in ED in AM, refused labs/imaging and was given mag citrate. Pt discharged, but coming back with vomiting. Pt denies fever, chills, CP, SOB. +constipation for the past week. States baseline ambulating, able to dance and performs all ADLs.     PMH: as above  PSH: denies  Social Hx: +tobacco, 1PPD x 60 years, 2 drinks per week, no drugs  Family Hx: denies cancer hx  ROS: per HPI    VITALS:  98.2   126/80   97   16   98%RA    GEN: NAD  HEENT: NC/AT, EOMI, NGT in place to suction  NECK: no JVD, thyroidmegaly  CVS: S1S2, no murmur, regular rate  PULM: CTAB, no wheezing, no rales, no rhonchi  ABD: mild tenderness lower quadrant, no guarding, no rebound  MUSCULOSKEL: no muscle tenderness  EXT: no edema  NEURO: AAOx3, CN II-XII grossly intact    LAB:  CBC: 10.6/16.4/258  BMP: 135  98  27      GLU: 134       4.3  25  0.76    ALT/AST/AP: 28/24/142    CT Abd/pelv:  IMPRESSION:     Metastatic colon cancer with primary lesion of the splenic flexure.     Resultant large bowel obstruction with pneumatosis and mesenteric venous gas adjacent to the ascending colon. Lactate level correlation   recommended.    Splenic and hepatic metastases.    A/P:  72 y.o. female with no significant PMH presents with constipation and abdominal pain.     #abdominal pain, constipation found to have metastatic colon ca  #large bowel obstruction  -admit to surgery team  -per surgery, Dr Sneed, to have surgery tonight  -ABx, fluid per primary  -NPO  -oncology consult    #tobacco use  -encourage smoking cessation  -nicotine patch daily    #dehydration/vomiting  -iv fluids  -antiemetics    #DVT ppx PCP/Pulm: Maldonado    72 y.o. female with PMH psoriasis presents with constipation and abdominal pain. Pt was in ED in AM, refused labs/imaging and was given mag citrate. Pt discharged, but coming back with vomiting. Pt denies fever, chills, CP, SOB. +constipation for the past week. States baseline ambulating, able to dance and performs all ADLs.     PMH: as above  PSH: denies  Social Hx: +tobacco, 1PPD x 60 years, 2 drinks per week, no drugs  Family Hx: denies cancer hx  ROS: per HPI    VITALS:  98.2   126/80   97   16   98%RA    GEN: NAD  HEENT: NC/AT, EOMI, NGT in place to suction  NECK: no JVD, thyroidmegaly  CVS: S1S2, no murmur, regular rate  PULM: CTAB, no wheezing, no rales, no rhonchi  ABD: mild tenderness lower quadrant, no guarding, no rebound  MUSCULOSKEL: no muscle tenderness  EXT: no edema  NEURO: AAOx3, CN II-XII grossly intact    LAB:  CBC: 10.6/16.4/258  BMP: 135  98  27      GLU: 134       4.3  25  0.76    ALT/AST/AP: 28/24/142    CT Abd/pelv:  IMPRESSION:     Metastatic colon cancer with primary lesion of the splenic flexure.     Resultant large bowel obstruction with pneumatosis and mesenteric venous gas adjacent to the ascending colon. Lactate level correlation   recommended.    Splenic and hepatic metastases.

## 2017-08-20 NOTE — ED ADULT NURSE REASSESSMENT NOTE - NS ED NURSE REASSESS COMMENT FT1
Pt going to OR at this time. ABX given as ordered and tolerated well. Pt completely undressed and all jewelry and given to the daughter at bedside. An attempt made to collect urine with failure. Pt ready to be transfer at this time. All needs are met and safety maintained. Will follow up.

## 2017-08-20 NOTE — PHYSICAL THERAPY INITIAL EVALUATION ADULT - DID THE PATIENT HAVE SURGERY?
yes/s/p s/p end colostomy, mucous fistula, liver mass biopsy s/p end colostomy, mucous fistula, liver mass biopsy/yes

## 2017-08-20 NOTE — DISCHARGE NOTE ADULT - MEDICATION SUMMARY - MEDICATIONS TO TAKE
I will START or STAY ON the medications listed below when I get home from the hospital:    metroNIDAZOLE 500 mg oral tablet  -- 1 tab(s) by mouth every 8 hours  -- Indication: For cellulitis    amoxicillin-clavulanate 875 mg-125 mg oral tablet  -- 1 tab(s) by mouth 2 times a day  -- Indication: For cellulitis

## 2017-08-21 LAB
ANION GAP SERPL CALC-SCNC: 8 MMOL/L — SIGNIFICANT CHANGE UP (ref 5–17)
APPEARANCE UR: SIGNIFICANT CHANGE UP
BASOPHILS # BLD AUTO: 0 K/UL — SIGNIFICANT CHANGE UP (ref 0–0.2)
BASOPHILS NFR BLD AUTO: 0.4 % — SIGNIFICANT CHANGE UP (ref 0–2)
BILIRUB UR-MCNC: SIGNIFICANT CHANGE UP
BUN SERPL-MCNC: 18 MG/DL — SIGNIFICANT CHANGE UP (ref 7–23)
CALCIUM SERPL-MCNC: 7.8 MG/DL — LOW (ref 8.5–10.1)
CHLORIDE SERPL-SCNC: 105 MMOL/L — SIGNIFICANT CHANGE UP (ref 96–108)
CO2 SERPL-SCNC: 28 MMOL/L — SIGNIFICANT CHANGE UP (ref 22–31)
COLOR SPEC: SIGNIFICANT CHANGE UP
CREAT SERPL-MCNC: 0.69 MG/DL — SIGNIFICANT CHANGE UP (ref 0.5–1.3)
DIFF PNL FLD: SIGNIFICANT CHANGE UP
EOSINOPHIL # BLD AUTO: 0 K/UL — SIGNIFICANT CHANGE UP (ref 0–0.5)
EOSINOPHIL NFR BLD AUTO: 0 % — SIGNIFICANT CHANGE UP (ref 0–6)
GLUCOSE SERPL-MCNC: 87 MG/DL — SIGNIFICANT CHANGE UP (ref 70–99)
GLUCOSE UR QL: SIGNIFICANT CHANGE UP MG/DL
HCT VFR BLD CALC: 37.2 % — SIGNIFICANT CHANGE UP (ref 34.5–45)
HGB BLD-MCNC: 12.5 G/DL — SIGNIFICANT CHANGE UP (ref 11.5–15.5)
KETONES UR-MCNC: SIGNIFICANT CHANGE UP
LEUKOCYTE ESTERASE UR-ACNC: SIGNIFICANT CHANGE UP
LYMPHOCYTES # BLD AUTO: 1 K/UL — SIGNIFICANT CHANGE UP (ref 1–3.3)
LYMPHOCYTES # BLD AUTO: 11.4 % — LOW (ref 13–44)
MCHC RBC-ENTMCNC: 31.4 PG — SIGNIFICANT CHANGE UP (ref 27–34)
MCHC RBC-ENTMCNC: 33.7 GM/DL — SIGNIFICANT CHANGE UP (ref 32–36)
MCV RBC AUTO: 93.1 FL — SIGNIFICANT CHANGE UP (ref 80–100)
MONOCYTES # BLD AUTO: 0.6 K/UL — SIGNIFICANT CHANGE UP (ref 0–0.9)
MONOCYTES NFR BLD AUTO: 6.5 % — SIGNIFICANT CHANGE UP (ref 2–14)
NEUTROPHILS # BLD AUTO: 7 K/UL — SIGNIFICANT CHANGE UP (ref 1.8–7.4)
NEUTROPHILS NFR BLD AUTO: 81.7 % — HIGH (ref 43–77)
NITRITE UR-MCNC: SIGNIFICANT CHANGE UP
PH UR: SIGNIFICANT CHANGE UP (ref 5–8)
PLATELET # BLD AUTO: 180 K/UL — SIGNIFICANT CHANGE UP (ref 150–400)
POTASSIUM SERPL-MCNC: 4 MMOL/L — SIGNIFICANT CHANGE UP (ref 3.5–5.3)
POTASSIUM SERPL-SCNC: 4 MMOL/L — SIGNIFICANT CHANGE UP (ref 3.5–5.3)
PROT UR-MCNC: SIGNIFICANT CHANGE UP MG/DL
RBC # BLD: 4 M/UL — SIGNIFICANT CHANGE UP (ref 3.8–5.2)
RBC # FLD: 12.5 % — SIGNIFICANT CHANGE UP (ref 10.3–14.5)
SODIUM SERPL-SCNC: 141 MMOL/L — SIGNIFICANT CHANGE UP (ref 135–145)
SP GR SPEC: SIGNIFICANT CHANGE UP (ref 1.01–1.02)
UROBILINOGEN FLD QL: SIGNIFICANT CHANGE UP MG/DL
WBC # BLD: 8.5 K/UL — SIGNIFICANT CHANGE UP (ref 3.8–10.5)
WBC # FLD AUTO: 8.5 K/UL — SIGNIFICANT CHANGE UP (ref 3.8–10.5)

## 2017-08-21 RX ORDER — OXYCODONE AND ACETAMINOPHEN 5; 325 MG/1; MG/1
1 TABLET ORAL EVERY 4 HOURS
Qty: 0 | Refills: 0 | Status: DISCONTINUED | OUTPATIENT
Start: 2017-08-21 | End: 2017-08-24

## 2017-08-21 RX ADMIN — SODIUM CHLORIDE 100 MILLILITER(S): 9 INJECTION INTRAMUSCULAR; INTRAVENOUS; SUBCUTANEOUS at 03:59

## 2017-08-21 RX ADMIN — ENOXAPARIN SODIUM 40 MILLIGRAM(S): 100 INJECTION SUBCUTANEOUS at 11:32

## 2017-08-21 NOTE — ADVANCED PRACTICE NURSE CONSULT - RECOMMEDATIONS
Living will and health care proxy placed in medical record chart(paper copy). Daughter agrees that patient has had too much information since her diagnosis and will discuss further treatment prior to discharge. Patient will follow up with the surgeon.

## 2017-08-21 NOTE — PROGRESS NOTE ADULT - ASSESSMENT
71 yo fem splenic flexure mass/ostomy  -full liquid diet  -Heplock IV  -D/C mujica  -percocet  -D/C PCA  -Poss d/c home tomorrow

## 2017-08-22 LAB
ANION GAP SERPL CALC-SCNC: 7 MMOL/L — SIGNIFICANT CHANGE UP (ref 5–17)
BUN SERPL-MCNC: 10 MG/DL — SIGNIFICANT CHANGE UP (ref 7–23)
CALCIUM SERPL-MCNC: 7.8 MG/DL — LOW (ref 8.5–10.1)
CHLORIDE SERPL-SCNC: 104 MMOL/L — SIGNIFICANT CHANGE UP (ref 96–108)
CO2 SERPL-SCNC: 27 MMOL/L — SIGNIFICANT CHANGE UP (ref 22–31)
CREAT SERPL-MCNC: 0.42 MG/DL — LOW (ref 0.5–1.3)
GLUCOSE SERPL-MCNC: 110 MG/DL — HIGH (ref 70–99)
HCT VFR BLD CALC: 35 % — SIGNIFICANT CHANGE UP (ref 34.5–45)
HGB BLD-MCNC: 12.2 G/DL — SIGNIFICANT CHANGE UP (ref 11.5–15.5)
MCHC RBC-ENTMCNC: 31.9 PG — SIGNIFICANT CHANGE UP (ref 27–34)
MCHC RBC-ENTMCNC: 34.7 GM/DL — SIGNIFICANT CHANGE UP (ref 32–36)
MCV RBC AUTO: 91.8 FL — SIGNIFICANT CHANGE UP (ref 80–100)
PLATELET # BLD AUTO: 162 K/UL — SIGNIFICANT CHANGE UP (ref 150–400)
POTASSIUM SERPL-MCNC: 3.8 MMOL/L — SIGNIFICANT CHANGE UP (ref 3.5–5.3)
POTASSIUM SERPL-SCNC: 3.8 MMOL/L — SIGNIFICANT CHANGE UP (ref 3.5–5.3)
RBC # BLD: 3.82 M/UL — SIGNIFICANT CHANGE UP (ref 3.8–5.2)
RBC # FLD: 12.4 % — SIGNIFICANT CHANGE UP (ref 10.3–14.5)
SODIUM SERPL-SCNC: 138 MMOL/L — SIGNIFICANT CHANGE UP (ref 135–145)
WBC # BLD: 9.6 K/UL — SIGNIFICANT CHANGE UP (ref 3.8–10.5)
WBC # FLD AUTO: 9.6 K/UL — SIGNIFICANT CHANGE UP (ref 3.8–10.5)

## 2017-08-22 RX ORDER — METRONIDAZOLE 500 MG
500 TABLET ORAL EVERY 8 HOURS
Qty: 0 | Refills: 0 | Status: DISCONTINUED | OUTPATIENT
Start: 2017-08-22 | End: 2017-08-24

## 2017-08-22 RX ADMIN — Medication 500 MILLIGRAM(S): at 21:15

## 2017-08-22 RX ADMIN — Medication 1 TABLET(S): at 21:16

## 2017-08-22 RX ADMIN — ENOXAPARIN SODIUM 40 MILLIGRAM(S): 100 INJECTION SUBCUTANEOUS at 12:16

## 2017-08-22 NOTE — ADVANCED PRACTICE NURSE CONSULT - REASON FOR CONSULT
Colostomy education
Patient is uncertain of what the future is in view of her diagnosis of colon cancer
Colotomy education

## 2017-08-22 NOTE — ADVANCED PRACTICE NURSE CONSULT - ASSESSMENT
This is a 72 year old female that was admitted to the hospital on 8/19/2017 for bowel obstruction. PMH- Psoriasis. Patient underwent surgery on 8/19/2017 for colon cancer and now has colostomy to RUQ.     In to assess patient's wafer as staff reporting it was leaking. Patient presents sitting in a chair at the bedside. Patient assisted to the bed to assess ostomy appliance. Old appliance removed. Peristomal skin cleansed and noted to be intact. Stoma red, moist, viable and nicely protruded. Measures 1 3/4". 2 3/4" wafer cut to fit stoma. Abdominal crease noted to lateral aspect of stoma. Barrier ring used to assist in preventing leaking of stool.  Wafer placed around stoma and pouch snapped into place. Patient watched entire the process of the ostomy appliance change with mirror. Mucus stoma flat. Scant amount of drainage noted. Patient requesting to remain in bed after appliance change.     Encouraging patient to review ostomy education folder.
Patient is a 72 year old woman who lives alone in an upstairs apartment in [place]. 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 She is unsure of how to handle the ostomy and reports to being a slow learner. Will encourage her to watch the video on Ostomy Care. Patient would like to go to rehab on discharge so she is better able to care for her colostomy. Ostomy nurse to follow up today and tomorrow.  patient understands that she will need chemotherapy and has met Dr. Desouza but also wants an opinion from [facility]. Daughter Marcia is patients support, asking appropriate questions on how to gather mother's medical records. Patient  is also asking about Chemotherapy and radiation therapy . Patient is clearly overwhelmed by the newness of the diagnosis stated "I have never been in a hospital and have only had a headache. Admitted that she never had a colonoscopy.
This is a 72 year old female that was admitted to the hospital on 8/19/2017 for bowel obstruction. PMH- Psoriasis. Patient underwent surgery on 8/19/2017 for colon cancer and now has colostomy to RUQ.     In to initiate ostomy education with patient. Reviewed the creation and function of both the colostomy and mucous stoma. Educated patient on diet, bathing and supplies. Patient receptive and asking pertinent questions. Colostomy noted to have mushy stool and is function. Mucous stoma with scant amount of drainage. No leaking noted. Ostomy education folder left at the bedside for patient to review.     Patient remains sitting in a chair at the bedside.

## 2017-08-22 NOTE — PROGRESS NOTE ADULT - ASSESSMENT
POD#3 s/p end colostomy, mucous fistula, redness around mucous fistula, normal WBC, afebrile  -Will start Augmentin/flagyl to treat poss cellulitis around stoma  -Soft diet  -Patient wants to go to rehab  -PT  -Stoma teaching

## 2017-08-23 LAB
HCT VFR BLD CALC: 35.5 % — SIGNIFICANT CHANGE UP (ref 34.5–45)
HGB BLD-MCNC: 12.1 G/DL — SIGNIFICANT CHANGE UP (ref 11.5–15.5)
MCHC RBC-ENTMCNC: 31.2 PG — SIGNIFICANT CHANGE UP (ref 27–34)
MCHC RBC-ENTMCNC: 34.1 GM/DL — SIGNIFICANT CHANGE UP (ref 32–36)
MCV RBC AUTO: 91.6 FL — SIGNIFICANT CHANGE UP (ref 80–100)
PLATELET # BLD AUTO: 161 K/UL — SIGNIFICANT CHANGE UP (ref 150–400)
RBC # BLD: 3.87 M/UL — SIGNIFICANT CHANGE UP (ref 3.8–5.2)
RBC # FLD: 12.2 % — SIGNIFICANT CHANGE UP (ref 10.3–14.5)
WBC # BLD: 9.2 K/UL — SIGNIFICANT CHANGE UP (ref 3.8–10.5)
WBC # FLD AUTO: 9.2 K/UL — SIGNIFICANT CHANGE UP (ref 3.8–10.5)

## 2017-08-23 RX ORDER — LACTOBACILLUS ACIDOPHILUS 100MM CELL
1 CAPSULE ORAL
Qty: 0 | Refills: 0 | Status: DISCONTINUED | OUTPATIENT
Start: 2017-08-23 | End: 2017-08-24

## 2017-08-23 RX ADMIN — Medication 500 MILLIGRAM(S): at 05:34

## 2017-08-23 RX ADMIN — Medication 500 MILLIGRAM(S): at 22:16

## 2017-08-23 RX ADMIN — Medication 500 MILLIGRAM(S): at 14:18

## 2017-08-23 RX ADMIN — Medication 1 TABLET(S): at 11:11

## 2017-08-23 RX ADMIN — Medication 1 TABLET(S): at 17:48

## 2017-08-23 RX ADMIN — Medication 1 TABLET(S): at 17:49

## 2017-08-23 RX ADMIN — ENOXAPARIN SODIUM 40 MILLIGRAM(S): 100 INJECTION SUBCUTANEOUS at 11:11

## 2017-08-24 VITALS
OXYGEN SATURATION: 97 % | SYSTOLIC BLOOD PRESSURE: 118 MMHG | DIASTOLIC BLOOD PRESSURE: 74 MMHG | HEART RATE: 63 BPM | TEMPERATURE: 98 F | RESPIRATION RATE: 16 BRPM

## 2017-08-24 RX ORDER — METRONIDAZOLE 500 MG
1 TABLET ORAL
Qty: 21 | Refills: 0 | OUTPATIENT
Start: 2017-08-24 | End: 2017-08-31

## 2017-08-24 RX ORDER — OXYCODONE HYDROCHLORIDE 5 MG/1
1 TABLET ORAL
Qty: 20 | Refills: 0 | OUTPATIENT
Start: 2017-08-24

## 2017-08-24 RX ORDER — METRONIDAZOLE 500 MG
1 TABLET ORAL
Qty: 0 | Refills: 0 | COMMUNITY
Start: 2017-08-24

## 2017-08-24 RX ORDER — ACETAMINOPHEN 500 MG
650 TABLET ORAL EVERY 4 HOURS
Qty: 0 | Refills: 0 | Status: DISCONTINUED | OUTPATIENT
Start: 2017-08-24 | End: 2017-08-24

## 2017-08-24 RX ADMIN — ENOXAPARIN SODIUM 40 MILLIGRAM(S): 100 INJECTION SUBCUTANEOUS at 11:56

## 2017-08-24 RX ADMIN — Medication 1 TABLET(S): at 06:19

## 2017-08-24 RX ADMIN — Medication 500 MILLIGRAM(S): at 06:19

## 2017-08-24 RX ADMIN — Medication 500 MILLIGRAM(S): at 14:53

## 2017-08-24 RX ADMIN — Medication 1 TABLET(S): at 08:52

## 2017-08-24 NOTE — PROGRESS NOTE ADULT - PROBLEM SELECTOR PLAN 1
clinically colonic primary but question raised regarding upper GI source by pathologist  based on staining pattern  shall DW surgery  shall likely need upper GI evaluation as outpatient prior to starting palliative chemo

## 2017-08-24 NOTE — PROGRESS NOTE ADULT - SUBJECTIVE AND OBJECTIVE BOX
73 yo fem splenic flexure mass/liver mets s/p colostomy, doing well, OOB    Abd soft, working ostomy (250ml)  dry stapled wound  pink mucous fistula              Vital Signs Last 24 Hrs  T(C): 36.3 (21 Aug 2017 04:58), Max: 36.4 (20 Aug 2017 13:13)  T(F): 97.3 (21 Aug 2017 04:58), Max: 97.6 (20 Aug 2017 13:13)  HR: 69 (21 Aug 2017 04:58) (69 - 73)  BP: 97/72 (21 Aug 2017 04:58) (95/64 - 120/70)  BP(mean): --  RR: 16 (21 Aug 2017 04:58) (16 - 16)  SpO2: 94% (21 Aug 2017 04:58) (94% - 97%)                          12.5   8.5   )-----------( 180      ( 21 Aug 2017 06:42 )             37.2       08-21    141  |  105  |  18  ----------------------------<  87  4.0   |  28  |  0.69    Ca    7.8<L>      21 Aug 2017 06:42  Mg     2.8     08-19    TPro  7.5  /  Alb  3.7  /  TBili  0.7  /  DBili  x   /  AST  24  /  ALT  28  /  AlkPhos  142<H>  08-19      LIVER FUNCTIONS - ( 19 Aug 2017 18:15 )  Alb: 3.7 g/dL / Pro: 7.5 gm/dL / ALK PHOS: 142 U/L / ALT: 28 U/L / AST: 24 U/L / GGT: x             MEDICATIONS  (STANDING):  enoxaparin Injectable 40 milliGRAM(s) SubCutaneous every 24 hours    MEDICATIONS  (PRN):  naloxone Injectable 0.1 milliGRAM(s) IV Push every 3 minutes PRN For ANY of the following changes in patient status:  A. RR LESS THAN 10 breaths per minute, B. Oxygen saturation LESS THAN 90%, C. Sedation score of 6  ondansetron Injectable 4 milliGRAM(s) IV Push every 6 hours PRN Nausea  ondansetron Injectable 4 milliGRAM(s) IV Push every 6 hours PRN Nausea  morphine  - Injectable 4 milliGRAM(s) IV Push every 4 hours PRN Moderate Pain (4 - 6)  oxyCODONE    5 mG/acetaminophen 325 mG 1 Tablet(s) Oral every 4 hours PRN Moderate Pain (4 - 6)      MEDICATIONS  (STANDING):  enoxaparin Injectable 40 milliGRAM(s) SubCutaneous every 24 hours
73 yo fem with obstructed splenic flexure mass liver mets, s/p end colostomy, mucous fistula, liver mass biopsy    Doing Ok  Abd functioning ostomy              Vital Signs Last 24 Hrs  T(C): 36.3 (20 Aug 2017 05:57), Max: 36.8 (19 Aug 2017 17:32)  T(F): 97.3 (20 Aug 2017 05:57), Max: 98.2 (19 Aug 2017 17:32)  HR: 67 (20 Aug 2017 05:57) (60 - 97)  BP: 110/68 (20 Aug 2017 05:57) (105/63 - 134/79)  BP(mean): --  RR: 12 (20 Aug 2017 05:57) (11 - 17)  SpO2: 98% (20 Aug 2017 05:57) (98% - 100%)                          16.4   10.6  )-----------( 258      ( 19 Aug 2017 18:15 )             48.2       08-19    135  |  98  |  27<H>  ----------------------------<  134<H>  4.3   |  25  |  0.76    Ca    9.6      19 Aug 2017 18:15  Mg     2.8     08-19    TPro  7.5  /  Alb  3.7  /  TBili  0.7  /  DBili  x   /  AST  24  /  ALT  28  /  AlkPhos  142<H>  08-19      LIVER FUNCTIONS - ( 19 Aug 2017 18:15 )  Alb: 3.7 g/dL / Pro: 7.5 gm/dL / ALK PHOS: 142 U/L / ALT: 28 U/L / AST: 24 U/L / GGT: x             MEDICATIONS  (STANDING):  HYDROmorphone PCA (1 mG/mL) 30 milliLiter(s) PCA Continuous PCA Continuous  enoxaparin Injectable 40 milliGRAM(s) SubCutaneous every 24 hours  sodium chloride 0.9%. 1000 milliLiter(s) (100 mL/Hr) IV Continuous <Continuous>  cefoTEtan  IVPB 2 Gram(s) IV Intermittent every 12 hours    MEDICATIONS  (PRN):  HYDROmorphone PCA (1 mG/mL) Rescue Clinician Bolus 0.5 milliGRAM(s) IV Push every 15 minutes PRN for Pain Scale GREATER THAN 6  naloxone Injectable 0.1 milliGRAM(s) IV Push every 3 minutes PRN For ANY of the following changes in patient status:  A. RR LESS THAN 10 breaths per minute, B. Oxygen saturation LESS THAN 90%, C. Sedation score of 6  ondansetron Injectable 4 milliGRAM(s) IV Push every 6 hours PRN Nausea  ondansetron Injectable 4 milliGRAM(s) IV Push every 6 hours PRN Nausea  morphine  - Injectable 4 milliGRAM(s) IV Push every 4 hours PRN Moderate Pain (4 - 6)      MEDICATIONS  (STANDING):  HYDROmorphone PCA (1 mG/mL) 30 milliLiter(s) PCA Continuous PCA Continuous  enoxaparin Injectable 40 milliGRAM(s) SubCutaneous every 24 hours  sodium chloride 0.9%. 1000 milliLiter(s) (100 mL/Hr) IV Continuous <Continuous>  cefoTEtan  IVPB 2 Gram(s) IV Intermittent every 12 hours
PCP/Pulm: Maldonado    72 y.o. female with PMH psoriasis presents on 8/19/17 with constipation and abdominal pain. Pt was in ED in AM, refused labs/imaging and was given mag citrate. Pt discharged, but coming back with vomiting. Pt denies fever, chills, CP, SOB. +constipation for the past week. States baseline ambulating, able to dance and performs all ADLs.     8/20 - s/p end colostomy, mucous fistula, liver mass biopsy, pain well controlled, colostomy functioning    T(C): 36.4 (08-20-17 @ 13:13), Max: 36.8 (08-19-17 @ 17:32)  T(F): 97.6 (08-20-17 @ 13:13), Max: 98.2 (08-19-17 @ 17:32)  HR: 73 (08-20-17 @ 13:13) (60 - 97)  BP: 107/69 (08-20-17 @ 13:13) (105/63 - 134/79)  RR: 16 (08-20-17 @ 13:13) (11 - 17)  SpO2: 97% (08-20-17 @ 13:13) (97% - 100%)  Wt(kg): --    PHYSICAL EXAM:    Constitutional: NAD, awake and alert, well-developed  HEENT: PERR, EOMI, Normal Hearing, MMM  Neck: Soft and supple, No LAD, No JVD  Respiratory: Breath sounds are clear bilaterally, No wheezing, rales or rhonchi  Cardiovascular: S1 and S2, regular rate and rhythm, no Murmurs, gallops or rubs  Gastrointestinal: Bowel Sounds hypoactive, RUQ colostomy, LUQ fistula, nondistended, no guarding, no rebound  Extremities: No peripheral edema  Vascular: 2+ peripheral pulses  Neurological: A/O x 3, no focal deficits  Musculoskeletal: 5/5 strength b/l upper and lower extremities  Skin: No rashes  rectal : deferred, not indicated    LAB:  08-19    135  |  98  |  27<H>  ----------------------------<  134<H>  4.3   |  25  |  0.76    Ca    9.6      19 Aug 2017 18:15  Mg     2.8     08-19    TPro  7.5  /  Alb  3.7  /  TBili  0.7  /  DBili  x   /  AST  24  /  ALT  28  /  AlkPhos  142<H>  08-19                         16.4   10.6  )-----------( 258      ( 19 Aug 2017 18:15 )             48.2             LIVER FUNCTIONS - ( 19 Aug 2017 18:15 )  Alb: 3.7 g/dL / Pro: 7.5 gm/dL / ALK PHOS: 142 U/L / ALT: 28 U/L / AST: 24 U/L / GGT: x             PT/INR - ( 19 Aug 2017 18:15 )   PT: 11.8 sec;   INR: 1.09 ratio         CBC: 10.6/16.4/258  BMP: 135  98  27      GLU: 134       4.3  25  0.76    ALT/AST/AP: 28/24/142    CT Abd/pelv:  IMPRESSION:     Metastatic colon cancer with primary lesion of the splenic flexure.     Resultant large bowel obstruction with pneumatosis and mesenteric venous gas adjacent to the ascending colon. Lactate level correlation   recommended.    Splenic and hepatic metastases.    MEDICATIONS  (STANDING):  HYDROmorphone PCA (1 mG/mL) 30 milliLiter(s) PCA Continuous PCA Continuous  enoxaparin Injectable 40 milliGRAM(s) SubCutaneous every 24 hours  sodium chloride 0.9%. 1000 milliLiter(s) (100 mL/Hr) IV Continuous <Continuous>  cefoTEtan  IVPB 2 Gram(s) IV Intermittent every 12 hours    MEDICATIONS  (PRN):  HYDROmorphone PCA (1 mG/mL) Rescue Clinician Bolus 0.5 milliGRAM(s) IV Push every 15 minutes PRN for Pain Scale GREATER THAN 6  naloxone Injectable 0.1 milliGRAM(s) IV Push every 3 minutes PRN For ANY of the following changes in patient status:  A. RR LESS THAN 10 breaths per minute, B. Oxygen saturation LESS THAN 90%, C. Sedation score of 6  ondansetron Injectable 4 milliGRAM(s) IV Push every 6 hours PRN Nausea  ondansetron Injectable 4 milliGRAM(s) IV Push every 6 hours PRN Nausea  morphine  - Injectable 4 milliGRAM(s) IV Push every 4 hours PRN Moderate Pain (4 - 6)        A/P:  72 y.o. female with no significant PMH presents with constipation and abdominal pain.     # Metastatic colon cancer with liver mets  - complicated by large bowel obstruction   - s/p s/p end colostomy, mucous fistula, liver mass biopsy on 8/19/17  - as per surgery IV abx, IV fluids, pain management  -NPO advance diet as per surgery  - DVT proph, incentive spirometry  - [facility]  - oncology consult Dr. Desouza - f/u path, shall requere chemo, f/u in 3 weeks as o/p    #Nicotine dependence  -encourage smoking cessation  -nicotine patch daily    #Dehydration/vomiting  -iv fluids  -antiemetics    #DVT ppx      Thank you for consult, will follow with you
PCP/Pulm: Maldonado    72 y.o. female with PMH psoriasis presents on 8/19/17 with constipation and abdominal pain. Pt was in ED in AM, refused labs/imaging and was given mag citrate. Pt discharged, but coming back with vomiting. Pt denies fever, chills, CP, SOB. +constipation for the past week. States baseline ambulating, able to dance and performs all ADLs.     8/20 - s/p end colostomy, mucous fistula, liver mass biopsy, pain well controlled, colostomy functioning  8/21 - oob--> chair, pain well controlled, tolerates liquid diet  8/22 - denies CP, cough, tolerating clear liquids, denies new sx    T(C): 36.7 (08-22-17 @ 14:40), Max: 36.8 (08-22-17 @ 03:37)  T(F): 98.1 (08-22-17 @ 14:40), Max: 98.3 (08-22-17 @ 03:37)  HR: 86 (08-22-17 @ 14:40) (78 - 86)  BP: 108/- (08-22-17 @ 14:40) (108/- - 114/88)  RR: 17 (08-22-17 @ 14:40) (17 - 18)  SpO2: 93% (08-22-17 @ 14:40) (92% - 95%)  Wt(kg): --    PHYSICAL EXAM:    Constitutional: NAD, awake and alert, well-developed  HEENT: PERR, EOMI, Normal Hearing, MMM  Neck: Soft and supple, No LAD, No JVD  Respiratory: Breath sounds are clear bilaterally, No wheezing, rales or rhonchi  Cardiovascular: S1 and S2, regular rate and rhythm, no Murmurs, gallops or rubs  Gastrointestinal: Bowel Sounds hypoactive, RUQ colostomy, LUQ fistula, nondistended, no guarding, no rebound  Extremities: No peripheral edema  Vascular: 2+ peripheral pulses  Neurological: A/O x 3, no focal deficits  Musculoskeletal: 5/5 strength b/l upper and lower extremities  Skin: No rashes  rectal : deferred, not indicated    LAB:  08-22    138  |  104  |  10  ----------------------------<  110<H>  3.8   |  27  |  0.42<L>    Ca    7.8<L>      22 Aug 2017 05:40                            12.2   9.6   )-----------( 162      ( 22 Aug 2017 05:40 )             35.0                       08-21    141  |  105  |  18  ----------------------------<  87  4.0   |  28  |  0.69    Ca    7.8<L>      21 Aug 2017 06:42  Mg     2.8     08-19    TPro  7.5  /  Alb  3.7  /  TBili  0.7  /  DBili  x   /  AST  24  /  ALT  28  /  AlkPhos  142<H>  08-19                            12.5   8.5   )-----------( 180      ( 21 Aug 2017 06:42 )          37.2             LIVER FUNCTIONS - ( 19 Aug 2017 18:15 )  Alb: 3.7 g/dL / Pro: 7.5 gm/dL / ALK PHOS: 142 U/L / ALT: 28 U/L / AST: 24 U/L / GGT: x             PT/INR - ( 19 Aug 2017 18:15 )   PT: 11.8 sec;   INR: 1.09 ratio               08-19    135  |  98  |  27<H>  ----------------------------<  134<H>  4.3   |  25  |  0.76    Ca    9.6      19 Aug 2017 18:15  Mg     2.8     08-19    TPro  7.5  /  Alb  3.7  /  TBili  0.7  /  DBili  x   /  AST  24  /  ALT  28  /  AlkPhos  142<H>  08-19                         16.4   10.6  )-----------( 258      ( 19 Aug 2017 18:15 )             48.2             LIVER FUNCTIONS - ( 19 Aug 2017 18:15 )  Alb: 3.7 g/dL / Pro: 7.5 gm/dL / ALK PHOS: 142 U/L / ALT: 28 U/L / AST: 24 U/L / GGT: x             PT/INR - ( 19 Aug 2017 18:15 )   PT: 11.8 sec;   INR: 1.09 ratio         CBC: 10.6/16.4/258  BMP: 135  98  27      GLU: 134       4.3  25  0.76    ALT/AST/AP: 28/24/142    CT Abd/pelv:  IMPRESSION:     Metastatic colon cancer with primary lesion of the splenic flexure.     Resultant large bowel obstruction with pneumatosis and mesenteric venous gas adjacent to the ascending colon. Lactate level correlation   recommended.    Splenic and hepatic metastases.    MEDICATIONS  (STANDING):  HYDROmorphone PCA (1 mG/mL) 30 milliLiter(s) PCA Continuous PCA Continuous  enoxaparin Injectable 40 milliGRAM(s) SubCutaneous every 24 hours  sodium chloride 0.9%. 1000 milliLiter(s) (100 mL/Hr) IV Continuous <Continuous>  cefoTEtan  IVPB 2 Gram(s) IV Intermittent every 12 hours    MEDICATIONS  (PRN):  HYDROmorphone PCA (1 mG/mL) Rescue Clinician Bolus 0.5 milliGRAM(s) IV Push every 15 minutes PRN for Pain Scale GREATER THAN 6  naloxone Injectable 0.1 milliGRAM(s) IV Push every 3 minutes PRN For ANY of the following changes in patient status:  A. RR LESS THAN 10 breaths per minute, B. Oxygen saturation LESS THAN 90%, C. Sedation score of 6  ondansetron Injectable 4 milliGRAM(s) IV Push every 6 hours PRN Nausea  ondansetron Injectable 4 milliGRAM(s) IV Push every 6 hours PRN Nausea  morphine  - Injectable 4 milliGRAM(s) IV Push every 4 hours PRN Moderate Pain (4 - 6)        A/P:  72 y.o. female with no significant PMH presents with constipation and abdominal pain.     # Metastatic colon cancer with liver mets  - complicated by large bowel obstruction   - s/p s/p end colostomy, mucous fistula, liver mass biopsy on 8/19/17  - as per surgery IV abx, IV fluids, pain management  - Clear liquid diet advance diet as per surgery  - DVT proph, incentive spirometry  - [facility]  - oncology consult Dr. Desouza - f/u path, shall requere chemo, f/u in 3 weeks as o/p    #Nicotine dependence  -encourage smoking cessation  -nicotine patch daily    #Dehydration/vomiting  -iv fluids  -antiemetics    #DVT ppx    Dispo - d/c planning plan for DONNIE      Thank you for consult, will follow with you
PCP/Pulm: Maldonado    72 y.o. female with PMH psoriasis presents on 8/19/17 with constipation and abdominal pain. Pt was in ED in AM, refused labs/imaging and was given mag citrate. Pt discharged, but coming back with vomiting. Pt denies fever, chills, CP, SOB. +constipation for the past week. States baseline ambulating, able to dance and performs all ADLs.     8/20 - s/p end colostomy, mucous fistula, liver mass biopsy, pain well controlled, colostomy functioning  8/21 - oob--> chair, pain well controlled, tolerates liquid diet  8/22 - denies CP, cough, tolerating clear liquids, denies new sx  8/23 - erythema aroud the ostomy , tolerating PO abx, denies CP, cough, abd pain  8/24 - patient feels weak, other wise pain controlled, colostomy functioning, tolerating PO intake    ROS - all other systems negative except mention above    T(C): 36.5 (08-24-17 @ 12:06), Max: 36.7 (08-24-17 @ 04:41)  T(F): 97.7 (08-24-17 @ 12:06), Max: 98 (08-24-17 @ 04:41)  HR: 75 (08-24-17 @ 12:06) (71 - 75)  BP: 106/70 (08-24-17 @ 12:06) (105/89 - 121/73)  RR: 17 (08-24-17 @ 12:06) (17 - 18)  SpO2: 96% (08-24-17 @ 12:06) (93% - 98%)  Wt(kg): --    PHYSICAL EXAM:    Constitutional: NAD, awake and alert, well-developed  HEENT: PERR, EOMI, Normal Hearing, MMM  Neck: Soft and supple, No LAD, No JVD  Respiratory: Breath sounds are clear bilaterally, No wheezing, rales or rhonchi  Cardiovascular: S1 and S2, regular rate and rhythm, no Murmurs, gallops or rubs  Gastrointestinal: Bowel Sounds hypoactive, RUQ colostomy, LUQ fistula, nondistended, no guarding, no rebound  Extremities: No peripheral edema  Vascular: 2+ peripheral pulses  Neurological: A/O x 3, no focal deficits  Musculoskeletal: 5/5 strength b/l upper and lower extremities  Skin: No rashes  rectal : deferred, not indicated    LAB:                          12.1   9.2   )-----------( 161      ( 23 Aug 2017 06:34 )             35.5     08-22    138  |  104  |  10  ----------------------------<  110<H>  3.8   |  27  |  0.42<L>    Ca    7.8<L>      22 Aug 2017 05:40                            12.2   9.6   )-----------( 162      ( 22 Aug 2017 05:40 )             35.0                       08-21    141  |  105  |  18  ----------------------------<  87  4.0   |  28  |  0.69    Ca    7.8<L>      21 Aug 2017 06:42  Mg     2.8     08-19    TPro  7.5  /  Alb  3.7  /  TBili  0.7  /  DBili  x   /  AST  24  /  ALT  28  /  AlkPhos  142<H>  08-19                            12.5   8.5   )-----------( 180      ( 21 Aug 2017 06:42 )          37.2             LIVER FUNCTIONS - ( 19 Aug 2017 18:15 )  Alb: 3.7 g/dL / Pro: 7.5 gm/dL / ALK PHOS: 142 U/L / ALT: 28 U/L / AST: 24 U/L / GGT: x             PT/INR - ( 19 Aug 2017 18:15 )   PT: 11.8 sec;   INR: 1.09 ratio               08-19    135  |  98  |  27<H>  ----------------------------<  134<H>  4.3   |  25  |  0.76    Ca    9.6      19 Aug 2017 18:15  Mg     2.8     08-19    TPro  7.5  /  Alb  3.7  /  TBili  0.7  /  DBili  x   /  AST  24  /  ALT  28  /  AlkPhos  142<H>  08-19                         16.4   10.6  )-----------( 258      ( 19 Aug 2017 18:15 )             48.2             LIVER FUNCTIONS - ( 19 Aug 2017 18:15 )  Alb: 3.7 g/dL / Pro: 7.5 gm/dL / ALK PHOS: 142 U/L / ALT: 28 U/L / AST: 24 U/L / GGT: x             PT/INR - ( 19 Aug 2017 18:15 )   PT: 11.8 sec;   INR: 1.09 ratio         CBC: 10.6/16.4/258  BMP: 135  98  27      GLU: 134       4.3  25  0.76    ALT/AST/AP: 28/24/142    CT Abd/pelv:  IMPRESSION:     Metastatic colon cancer with primary lesion of the splenic flexure.     Resultant large bowel obstruction with pneumatosis and mesenteric venous gas adjacent to the ascending colon. Lactate level correlation   recommended.    Splenic and hepatic metastases.    MEDICATIONS  (STANDING):  HYDROmorphone PCA (1 mG/mL) 30 milliLiter(s) PCA Continuous PCA Continuous  enoxaparin Injectable 40 milliGRAM(s) SubCutaneous every 24 hours  sodium chloride 0.9%. 1000 milliLiter(s) (100 mL/Hr) IV Continuous <Continuous>  cefoTEtan  IVPB 2 Gram(s) IV Intermittent every 12 hours    MEDICATIONS  (PRN):  HYDROmorphone PCA (1 mG/mL) Rescue Clinician Bolus 0.5 milliGRAM(s) IV Push every 15 minutes PRN for Pain Scale GREATER THAN 6  naloxone Injectable 0.1 milliGRAM(s) IV Push every 3 minutes PRN For ANY of the following changes in patient status:  A. RR LESS THAN 10 breaths per minute, B. Oxygen saturation LESS THAN 90%, C. Sedation score of 6  ondansetron Injectable 4 milliGRAM(s) IV Push every 6 hours PRN Nausea  ondansetron Injectable 4 milliGRAM(s) IV Push every 6 hours PRN Nausea  morphine  - Injectable 4 milliGRAM(s) IV Push every 4 hours PRN Moderate Pain (4 - 6)        A/P:  72 y.o. female with no significant PMH presents with constipation and abdominal pain.     # Metastatic colon cancer with liver mets  - complicated by large bowel obstruction   - s/p s/p end colostomy, mucous fistula, liver mass biopsy on 8/19/17  - as per surgery IV abx, IV fluids, pain management  - Clear liquid diet advance diet as per surgery  - DVT proph, incentive spirometry  - [facility]  - oncology consult Dr. Desouza - f/u path, shall requere chemo, f/u in 3 weeks as o/p  - on Augmentin and metro for cellulitis around ostomy as per surgery    #Nicotine dependence  -encourage smoking cessation  -nicotine patch daily    #Dehydration/vomiting  -iv fluids  -antiemetics    #DVT ppx    Dispo - d/c planning plan for DONNIE, medically stable for discharge      Thank you for consult, will follow with you
PCP/Pulm: Maldonado    72 y.o. female with PMH psoriasis presents on 8/19/17 with constipation and abdominal pain. Pt was in ED in AM, refused labs/imaging and was given mag citrate. Pt discharged, but coming back with vomiting. Pt denies fever, chills, CP, SOB. +constipation for the past week. States baseline ambulating, able to dance and performs all ADLs.     8/20 - s/p end colostomy, mucous fistula, liver mass biopsy, pain well controlled, colostomy functioning  8/21 - oob--> chair, pain well controlled, tolerates liquid diet  8/22 - denies CP, cough, tolerating clear liquids, denies new sx  8/23 - erythema aroud the ostomy , tolerating PO abx, denies CP, cough, abd pain  T(C): 37 (08-23-17 @ 03:48), Max: 37 (08-23-17 @ 03:48)  T(F): 98.6 (08-23-17 @ 03:48), Max: 98.6 (08-23-17 @ 03:48)  HR: 67 (08-23-17 @ 03:48) (67 - 86)  BP: 114/72 (08-23-17 @ 03:48) (108/- - 114/72)  RR: 18 (08-23-17 @ 03:48) (17 - 18)  SpO2: 95% (08-23-17 @ 03:48) (93% - 95%)  Wt(kg): --    PHYSICAL EXAM:    Constitutional: NAD, awake and alert, well-developed  HEENT: PERR, EOMI, Normal Hearing, MMM  Neck: Soft and supple, No LAD, No JVD  Respiratory: Breath sounds are clear bilaterally, No wheezing, rales or rhonchi  Cardiovascular: S1 and S2, regular rate and rhythm, no Murmurs, gallops or rubs  Gastrointestinal: Bowel Sounds hypoactive, RUQ colostomy, LUQ fistula, nondistended, no guarding, no rebound  Extremities: No peripheral edema  Vascular: 2+ peripheral pulses  Neurological: A/O x 3, no focal deficits  Musculoskeletal: 5/5 strength b/l upper and lower extremities  Skin: No rashes  rectal : deferred, not indicated    LAB:    08-22    138  |  104  |  10  ----------------------------<  110<H>  3.8   |  27  |  0.42<L>    Ca    7.8<L>      22 Aug 2017 05:40                            12.2   9.6   )-----------( 162      ( 22 Aug 2017 05:40 )             35.0                       08-21    141  |  105  |  18  ----------------------------<  87  4.0   |  28  |  0.69    Ca    7.8<L>      21 Aug 2017 06:42  Mg     2.8     08-19    TPro  7.5  /  Alb  3.7  /  TBili  0.7  /  DBili  x   /  AST  24  /  ALT  28  /  AlkPhos  142<H>  08-19                            12.5   8.5   )-----------( 180      ( 21 Aug 2017 06:42 )          37.2             LIVER FUNCTIONS - ( 19 Aug 2017 18:15 )  Alb: 3.7 g/dL / Pro: 7.5 gm/dL / ALK PHOS: 142 U/L / ALT: 28 U/L / AST: 24 U/L / GGT: x             PT/INR - ( 19 Aug 2017 18:15 )   PT: 11.8 sec;   INR: 1.09 ratio               08-19    135  |  98  |  27<H>  ----------------------------<  134<H>  4.3   |  25  |  0.76    Ca    9.6      19 Aug 2017 18:15  Mg     2.8     08-19    TPro  7.5  /  Alb  3.7  /  TBili  0.7  /  DBili  x   /  AST  24  /  ALT  28  /  AlkPhos  142<H>  08-19                         16.4   10.6  )-----------( 258      ( 19 Aug 2017 18:15 )             48.2             LIVER FUNCTIONS - ( 19 Aug 2017 18:15 )  Alb: 3.7 g/dL / Pro: 7.5 gm/dL / ALK PHOS: 142 U/L / ALT: 28 U/L / AST: 24 U/L / GGT: x             PT/INR - ( 19 Aug 2017 18:15 )   PT: 11.8 sec;   INR: 1.09 ratio         CBC: 10.6/16.4/258  BMP: 135  98  27      GLU: 134       4.3  25  0.76    ALT/AST/AP: 28/24/142    CT Abd/pelv:  IMPRESSION:     Metastatic colon cancer with primary lesion of the splenic flexure.     Resultant large bowel obstruction with pneumatosis and mesenteric venous gas adjacent to the ascending colon. Lactate level correlation   recommended.    Splenic and hepatic metastases.    MEDICATIONS  (STANDING):  HYDROmorphone PCA (1 mG/mL) 30 milliLiter(s) PCA Continuous PCA Continuous  enoxaparin Injectable 40 milliGRAM(s) SubCutaneous every 24 hours  sodium chloride 0.9%. 1000 milliLiter(s) (100 mL/Hr) IV Continuous <Continuous>  cefoTEtan  IVPB 2 Gram(s) IV Intermittent every 12 hours    MEDICATIONS  (PRN):  HYDROmorphone PCA (1 mG/mL) Rescue Clinician Bolus 0.5 milliGRAM(s) IV Push every 15 minutes PRN for Pain Scale GREATER THAN 6  naloxone Injectable 0.1 milliGRAM(s) IV Push every 3 minutes PRN For ANY of the following changes in patient status:  A. RR LESS THAN 10 breaths per minute, B. Oxygen saturation LESS THAN 90%, C. Sedation score of 6  ondansetron Injectable 4 milliGRAM(s) IV Push every 6 hours PRN Nausea  ondansetron Injectable 4 milliGRAM(s) IV Push every 6 hours PRN Nausea  morphine  - Injectable 4 milliGRAM(s) IV Push every 4 hours PRN Moderate Pain (4 - 6)        A/P:  72 y.o. female with no significant PMH presents with constipation and abdominal pain.     # Metastatic colon cancer with liver mets  - complicated by large bowel obstruction   - s/p s/p end colostomy, mucous fistula, liver mass biopsy on 8/19/17  - as per surgery IV abx, IV fluids, pain management  - Clear liquid diet advance diet as per surgery  - DVT proph, incentive spirometry  - [facility]  - oncology consult Dr. Desouza - f/u path, shall requere chemo, f/u in 3 weeks as o/p  - on Augmentin and metro for cellulitis around ostomy as per surgery    #Nicotine dependence  -encourage smoking cessation  -nicotine patch daily    #Dehydration/vomiting  -iv fluids  -antiemetics    #DVT ppx    Dispo - d/c planning plan for DONNIE      Thank you for consult, will follow with you
POD#3 s/p colostomy/mucous fistula/ obstructed colon cancer, doing well, working stoma    Abd working colostomy, pink end colostomy, some redness below the mucous fistula, mildly tender, no drainage  Dry stapled midline wound              Vital Signs Last 24 Hrs  T(C): 36.8 (22 Aug 2017 03:37), Max: 36.8 (22 Aug 2017 03:37)  T(F): 98.3 (22 Aug 2017 03:37), Max: 98.3 (22 Aug 2017 03:37)  HR: 86 (22 Aug 2017 03:37) (78 - 86)  BP: 114/88 (22 Aug 2017 03:37) (107/62 - 114/88)  BP(mean): --  RR: 18 (22 Aug 2017 03:37) (17 - 18)  SpO2: 95% (22 Aug 2017 03:37) (89% - 95%)                          12.2   9.6   )-----------( 162      ( 22 Aug 2017 05:40 )             35.0       08-22    138  |  104  |  10  ----------------------------<  110<H>  3.8   |  27  |  0.42<L>    Ca    7.8<L>      22 Aug 2017 05:40            MEDICATIONS  (STANDING):  enoxaparin Injectable 40 milliGRAM(s) SubCutaneous every 24 hours  amoxicillin  875 milliGRAM(s)/clavulanate 1 Tablet(s) Oral two times a day  metroNIDAZOLE    Tablet 500 milliGRAM(s) Oral every 8 hours    MEDICATIONS  (PRN):  naloxone Injectable 0.1 milliGRAM(s) IV Push every 3 minutes PRN For ANY of the following changes in patient status:  A. RR LESS THAN 10 breaths per minute, B. Oxygen saturation LESS THAN 90%, C. Sedation score of 6  ondansetron Injectable 4 milliGRAM(s) IV Push every 6 hours PRN Nausea  ondansetron Injectable 4 milliGRAM(s) IV Push every 6 hours PRN Nausea  morphine  - Injectable 4 milliGRAM(s) IV Push every 4 hours PRN Moderate Pain (4 - 6)  oxyCODONE    5 mG/acetaminophen 325 mG 1 Tablet(s) Oral every 4 hours PRN Moderate Pain (4 - 6)      MEDICATIONS  (STANDING):  enoxaparin Injectable 40 milliGRAM(s) SubCutaneous every 24 hours  amoxicillin  875 milliGRAM(s)/clavulanate 1 Tablet(s) Oral two times a day  metroNIDAZOLE    Tablet 500 milliGRAM(s) Oral every 8 hours
POD#4 s/p end colostomy, mucous fistula, doing OK, tolerating diet    Abd stapled midlien wound, mucous fistula purple color, erythema around improved, end colostomy pink color with stool              Vital Signs Last 24 Hrs  T(C): 37 (23 Aug 2017 03:48), Max: 37 (23 Aug 2017 03:48)  T(F): 98.6 (23 Aug 2017 03:48), Max: 98.6 (23 Aug 2017 03:48)  HR: 67 (23 Aug 2017 03:48) (67 - 86)  BP: 114/72 (23 Aug 2017 03:48) (108/- - 114/72)  BP(mean): --  RR: 18 (23 Aug 2017 03:48) (17 - 18)  SpO2: 95% (23 Aug 2017 03:48) (93% - 95%)                          12.1   9.2   )-----------( 161      ( 23 Aug 2017 06:34 )             35.5       08-22    138  |  104  |  10  ----------------------------<  110<H>  3.8   |  27  |  0.42<L>    Ca    7.8<L>      22 Aug 2017 05:40            MEDICATIONS  (STANDING):  enoxaparin Injectable 40 milliGRAM(s) SubCutaneous every 24 hours  amoxicillin  875 milliGRAM(s)/clavulanate 1 Tablet(s) Oral two times a day  metroNIDAZOLE    Tablet 500 milliGRAM(s) Oral every 8 hours    MEDICATIONS  (PRN):  naloxone Injectable 0.1 milliGRAM(s) IV Push every 3 minutes PRN For ANY of the following changes in patient status:  A. RR LESS THAN 10 breaths per minute, B. Oxygen saturation LESS THAN 90%, C. Sedation score of 6  ondansetron Injectable 4 milliGRAM(s) IV Push every 6 hours PRN Nausea  ondansetron Injectable 4 milliGRAM(s) IV Push every 6 hours PRN Nausea  morphine  - Injectable 4 milliGRAM(s) IV Push every 4 hours PRN Moderate Pain (4 - 6)  oxyCODONE    5 mG/acetaminophen 325 mG 1 Tablet(s) Oral every 4 hours PRN Moderate Pain (4 - 6)      MEDICATIONS  (STANDING):  enoxaparin Injectable 40 milliGRAM(s) SubCutaneous every 24 hours  amoxicillin  875 milliGRAM(s)/clavulanate 1 Tablet(s) Oral two times a day  metroNIDAZOLE    Tablet 500 milliGRAM(s) Oral every 8 hours
Patient is a 72y old  Female who presents with a chief complaint of Abd pain and was found to have colonic obstruction due to a splenic flexure mass      HPI:  73 yo fem with abdominal distention found to have colonic obstruction. CT Abd showed obstructive splenic flexure mass with multiple liver mets. Has never had a colonoscopy or EGD.  she is now s/p a diverting colostomy.    liver biopsy taken     the primary lesion in the splenic flexure was fixed and could not be removed.    path adenocarcinoma CK 7+ CK20-      PAST MEDICAL & SURGICAL HISTORY:  Psoriasis  No significant past surgical history      REVIEW OF SYSTEMS    General:	The patient is post op tolerating po  appears tired  no unexpected weight loss.  Skin: no Rash.	  ENT: no sore throat or oral pain. no difficulty with swallowing  Respiratory: No chest pain, No shortness of breath, no hemoptysis, no cough, no chest pain.  Cardiovascular : No chest pain. no palpitation.  Gastrointestinal:  post op pain  Genitourinary: No hematuria , no dysuria	  Musculoskeletal: No myalgia, no arthralgia, no back pain, no joint swelling  Neurological: no headaches, no dizzyness,+weakness, no double vision. 	  Psychiatric: no change in mood. 	  Hematology/Lymphatics: no weakness. 	  Endocrine:	no burning in urine or frequency  Allergic/Immunologic:	 no fever.     Allergies    No Known Allergies    Intolerances        Occupation:  [employer]ol: Denied Smoking: Denied Drug Use: Denied  Marital Status:         FAMILY HISTORY:    MEDICATIONS  (STANDING):  enoxaparin Injectable 40 milliGRAM(s) SubCutaneous every 24 hours  amoxicillin  875 milliGRAM(s)/clavulanate 1 Tablet(s) Oral two times a day  metroNIDAZOLE    Tablet 500 milliGRAM(s) Oral every 8 hours  lactobacillus acidophilus 1 Tablet(s) Oral two times a day with meals        Vital Signs Last 24 Hrs  T(C): 36.7 (24 Aug 2017 04:41), Max: 36.7 (24 Aug 2017 04:41)  T(F): 98 (24 Aug 2017 04:41), Max: 98 (24 Aug 2017 04:41)  HR: 72 (24 Aug 2017 04:41) (71 - 72)  BP: 105/89 (24 Aug 2017 04:41) (105/89 - 121/73)  BP(mean): --  RR: 18 (24 Aug 2017 04:41) (18 - 18)  SpO2: 93% (24 Aug 2017 04:41) (93% - 98%)        Gen: post op   comfortable  HEENT: normocephalic/atraumatic, no conjunctival pallor, no scleral icterus, no oral thrush/mucosal bleeding/mucositis  Neck: supple, no masses, no JVD  Breasts: deferred  Back: nontender  Cardiovascular: heart sounds Normal S1S2, no murmurs/rubs/gallops  Respiratory: air entry normal and equal on both sides. no wheeze, no ronchi,   Gastrointestinal: colostomy functioning well   no evidence for ascites  Genitourinary: deferred  Rectal: deferred  Extremities: no clubbing/cyanosis, no edema, no calf tenderness  Neurological:  Alert oriented X3 cranial nerves normal. no focal deficits  Skin: no rash on visible skin  Lymph Nodes:  no cervical/supraclavicular LAD, no axillary/groin LAD  Musculoskeletal:  full ROM  Psychiatric:  mood appears normal.+ anxious or depressed.                            12.1   9.2   )-----------( 161      ( 23 Aug 2017 06:34 )             35.5         PT/INR - ( 19 Aug 2017 18:15 )   PT: 11.8 sec;   INR: 1.09 ratio      Path pending.  Adenocarcinoma  CK 7+ CK 20-  DW Dr. Adams. may be upper GI origin rather than colonic as per staining pattern      RADIOLOGY & ADDITIONAL STUDIES:    multiple lever mets in the liver involving both lobes. splenic implant  colonic obstruction
s/p colostomy, doing well    dry stapled wound,     D/C home
PCP/Pulm: Maldonado    72 y.o. female with PMH psoriasis presents on 8/19/17 with constipation and abdominal pain. Pt was in ED in AM, refused labs/imaging and was given mag citrate. Pt discharged, but coming back with vomiting. Pt denies fever, chills, CP, SOB. +constipation for the past week. States baseline ambulating, able to dance and performs all ADLs.     8/20 - s/p end colostomy, mucous fistula, liver mass biopsy, pain well controlled, colostomy functioning  8/21 - oob--> chair, pain well controlled, tolerates liquid diet    T(C): 36.6 (08-21-17 @ 12:14), Max: 36.6 (08-21-17 @ 12:14)  T(F): 97.8 (08-21-17 @ 12:14), Max: 97.8 (08-21-17 @ 12:14)  HR: 79 (08-21-17 @ 12:14) (69 - 79)  BP: 107/62 (08-21-17 @ 12:14) (95/64 - 120/70)  RR: 17 (08-21-17 @ 12:14) (16 - 17)  SpO2: 89% (08-21-17 @ 12:14) (89% - 95%)  Wt(kg): --    PHYSICAL EXAM:    Constitutional: NAD, awake and alert, well-developed  HEENT: PERR, EOMI, Normal Hearing, MMM  Neck: Soft and supple, No LAD, No JVD  Respiratory: Breath sounds are clear bilaterally, No wheezing, rales or rhonchi  Cardiovascular: S1 and S2, regular rate and rhythm, no Murmurs, gallops or rubs  Gastrointestinal: Bowel Sounds hypoactive, RUQ colostomy, LUQ fistula, nondistended, no guarding, no rebound  Extremities: No peripheral edema  Vascular: 2+ peripheral pulses  Neurological: A/O x 3, no focal deficits  Musculoskeletal: 5/5 strength b/l upper and lower extremities  Skin: No rashes  rectal : deferred, not indicated    LAB:  08-21    141  |  105  |  18  ----------------------------<  87  4.0   |  28  |  0.69    Ca    7.8<L>      21 Aug 2017 06:42  Mg     2.8     08-19    TPro  7.5  /  Alb  3.7  /  TBili  0.7  /  DBili  x   /  AST  24  /  ALT  28  /  AlkPhos  142<H>  08-19                            12.5   8.5   )-----------( 180      ( 21 Aug 2017 06:42 )          37.2             LIVER FUNCTIONS - ( 19 Aug 2017 18:15 )  Alb: 3.7 g/dL / Pro: 7.5 gm/dL / ALK PHOS: 142 U/L / ALT: 28 U/L / AST: 24 U/L / GGT: x             PT/INR - ( 19 Aug 2017 18:15 )   PT: 11.8 sec;   INR: 1.09 ratio               08-19    135  |  98  |  27<H>  ----------------------------<  134<H>  4.3   |  25  |  0.76    Ca    9.6      19 Aug 2017 18:15  Mg     2.8     08-19    TPro  7.5  /  Alb  3.7  /  TBili  0.7  /  DBili  x   /  AST  24  /  ALT  28  /  AlkPhos  142<H>  08-19                         16.4   10.6  )-----------( 258      ( 19 Aug 2017 18:15 )             48.2             LIVER FUNCTIONS - ( 19 Aug 2017 18:15 )  Alb: 3.7 g/dL / Pro: 7.5 gm/dL / ALK PHOS: 142 U/L / ALT: 28 U/L / AST: 24 U/L / GGT: x             PT/INR - ( 19 Aug 2017 18:15 )   PT: 11.8 sec;   INR: 1.09 ratio         CBC: 10.6/16.4/258  BMP: 135  98  27      GLU: 134       4.3  25  0.76    ALT/AST/AP: 28/24/142    CT Abd/pelv:  IMPRESSION:     Metastatic colon cancer with primary lesion of the splenic flexure.     Resultant large bowel obstruction with pneumatosis and mesenteric venous gas adjacent to the ascending colon. Lactate level correlation   recommended.    Splenic and hepatic metastases.    MEDICATIONS  (STANDING):  HYDROmorphone PCA (1 mG/mL) 30 milliLiter(s) PCA Continuous PCA Continuous  enoxaparin Injectable 40 milliGRAM(s) SubCutaneous every 24 hours  sodium chloride 0.9%. 1000 milliLiter(s) (100 mL/Hr) IV Continuous <Continuous>  cefoTEtan  IVPB 2 Gram(s) IV Intermittent every 12 hours    MEDICATIONS  (PRN):  HYDROmorphone PCA (1 mG/mL) Rescue Clinician Bolus 0.5 milliGRAM(s) IV Push every 15 minutes PRN for Pain Scale GREATER THAN 6  naloxone Injectable 0.1 milliGRAM(s) IV Push every 3 minutes PRN For ANY of the following changes in patient status:  A. RR LESS THAN 10 breaths per minute, B. Oxygen saturation LESS THAN 90%, C. Sedation score of 6  ondansetron Injectable 4 milliGRAM(s) IV Push every 6 hours PRN Nausea  ondansetron Injectable 4 milliGRAM(s) IV Push every 6 hours PRN Nausea  morphine  - Injectable 4 milliGRAM(s) IV Push every 4 hours PRN Moderate Pain (4 - 6)        A/P:  72 y.o. female with no significant PMH presents with constipation and abdominal pain.     # Metastatic colon cancer with liver mets  - complicated by large bowel obstruction   - s/p s/p end colostomy, mucous fistula, liver mass biopsy on 8/19/17  - as per surgery IV abx, IV fluids, pain management  - Clear liquid diet advance diet as per surgery  - DVT proph, incentive spirometry  - [facility]  - oncology consult Dr. Desouza - f/u path, shall requere chemo, f/u in 3 weeks as o/p    #Nicotine dependence  -encourage smoking cessation  -nicotine patch daily    #Dehydration/vomiting  -iv fluids  -antiemetics    #DVT ppx      Thank you for consult, will follow with you

## 2017-08-25 LAB — SURGICAL PATHOLOGY FINAL REPORT - CH: SIGNIFICANT CHANGE UP

## 2017-08-29 DIAGNOSIS — T81.4XXA INFECTION FOLLOWING A PROCEDURE, INITIAL ENCOUNTER: ICD-10-CM

## 2017-08-29 DIAGNOSIS — C78.7 SECONDARY MALIGNANT NEOPLASM OF LIVER AND INTRAHEPATIC BILE DUCT: ICD-10-CM

## 2017-08-29 DIAGNOSIS — K63.89 OTHER SPECIFIED DISEASES OF INTESTINE: ICD-10-CM

## 2017-08-29 DIAGNOSIS — K59.00 CONSTIPATION, UNSPECIFIED: ICD-10-CM

## 2017-08-29 DIAGNOSIS — L40.9 PSORIASIS, UNSPECIFIED: ICD-10-CM

## 2017-08-29 DIAGNOSIS — C18.5 MALIGNANT NEOPLASM OF SPLENIC FLEXURE: ICD-10-CM

## 2017-08-29 DIAGNOSIS — F17.200 NICOTINE DEPENDENCE, UNSPECIFIED, UNCOMPLICATED: ICD-10-CM

## 2017-08-29 DIAGNOSIS — L03.311 CELLULITIS OF ABDOMINAL WALL: ICD-10-CM
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
## 2017-08-29 DIAGNOSIS — K66.0 PERITONEAL ADHESIONS (POSTPROCEDURAL) (POSTINFECTION): ICD-10-CM

## 2017-08-29 DIAGNOSIS — R11.10 VOMITING, UNSPECIFIED: ICD-10-CM

## 2017-08-29 DIAGNOSIS — K56.60 UNSPECIFIED INTESTINAL OBSTRUCTION: ICD-10-CM
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
## 2017-08-29 DIAGNOSIS — E86.0 DEHYDRATION: ICD-10-CM

## 2017-09-05 ENCOUNTER — EMERGENCY (EMERGENCY)
Facility: HOSPITAL | Age: 72
LOS: 0 days | Discharge: HOME | End: 2017-09-05
Attending: EMERGENCY MEDICINE | Admitting: EMERGENCY MEDICINE
Payer: MEDICARE

## 2017-09-05 VITALS
DIASTOLIC BLOOD PRESSURE: 85 MMHG | RESPIRATION RATE: 16 BRPM | TEMPERATURE: 98 F | HEIGHT: 62 IN | WEIGHT: 117.95 LBS | HEART RATE: 103 BPM | SYSTOLIC BLOOD PRESSURE: 125 MMHG

## 2017-09-05 DIAGNOSIS — M79.89 OTHER SPECIFIED SOFT TISSUE DISORDERS: ICD-10-CM

## 2017-09-05 DIAGNOSIS — C18.9 MALIGNANT NEOPLASM OF COLON, UNSPECIFIED: ICD-10-CM

## 2017-09-05 DIAGNOSIS — Z93.3 COLOSTOMY STATUS: ICD-10-CM

## 2017-09-05 DIAGNOSIS — I82.432 ACUTE EMBOLISM AND THROMBOSIS OF LEFT POPLITEAL VEIN: ICD-10-CM

## 2017-09-05 LAB
ALBUMIN SERPL ELPH-MCNC: 2.9 G/DL — LOW (ref 3.3–5)
ALP SERPL-CCNC: 126 U/L — HIGH (ref 40–120)
ALT FLD-CCNC: 31 U/L — SIGNIFICANT CHANGE UP (ref 12–78)
ANION GAP SERPL CALC-SCNC: 6 MMOL/L — SIGNIFICANT CHANGE UP (ref 5–17)
APTT BLD: 27.5 SEC — SIGNIFICANT CHANGE UP (ref 27.5–37.4)
AST SERPL-CCNC: 34 U/L — SIGNIFICANT CHANGE UP (ref 15–37)
BASOPHILS # BLD AUTO: 0.1 K/UL — SIGNIFICANT CHANGE UP (ref 0–0.2)
BASOPHILS NFR BLD AUTO: 1.2 % — SIGNIFICANT CHANGE UP (ref 0–2)
BILIRUB SERPL-MCNC: 0.5 MG/DL — SIGNIFICANT CHANGE UP (ref 0.2–1.2)
BUN SERPL-MCNC: 10 MG/DL — SIGNIFICANT CHANGE UP (ref 7–23)
CALCIUM SERPL-MCNC: 8.8 MG/DL — SIGNIFICANT CHANGE UP (ref 8.5–10.1)
CHLORIDE SERPL-SCNC: 107 MMOL/L — SIGNIFICANT CHANGE UP (ref 96–108)
CO2 SERPL-SCNC: 25 MMOL/L — SIGNIFICANT CHANGE UP (ref 22–31)
CREAT SERPL-MCNC: 0.58 MG/DL — SIGNIFICANT CHANGE UP (ref 0.5–1.3)
EOSINOPHIL # BLD AUTO: 0.2 K/UL — SIGNIFICANT CHANGE UP (ref 0–0.5)
EOSINOPHIL NFR BLD AUTO: 2 % — SIGNIFICANT CHANGE UP (ref 0–6)
GLUCOSE SERPL-MCNC: 87 MG/DL — SIGNIFICANT CHANGE UP (ref 70–99)
HCT VFR BLD CALC: 36.9 % — SIGNIFICANT CHANGE UP (ref 34.5–45)
HGB BLD-MCNC: 13 G/DL — SIGNIFICANT CHANGE UP (ref 11.5–15.5)
INR BLD: 1.04 RATIO — SIGNIFICANT CHANGE UP (ref 0.88–1.16)
LYMPHOCYTES # BLD AUTO: 1.6 K/UL — SIGNIFICANT CHANGE UP (ref 1–3.3)
LYMPHOCYTES # BLD AUTO: 17.4 % — SIGNIFICANT CHANGE UP (ref 13–44)
MCHC RBC-ENTMCNC: 31.8 PG — SIGNIFICANT CHANGE UP (ref 27–34)
MCHC RBC-ENTMCNC: 35.2 GM/DL — SIGNIFICANT CHANGE UP (ref 32–36)
MCV RBC AUTO: 90.1 FL — SIGNIFICANT CHANGE UP (ref 80–100)
MONOCYTES # BLD AUTO: 0.7 K/UL — SIGNIFICANT CHANGE UP (ref 0–0.9)
MONOCYTES NFR BLD AUTO: 7.4 % — SIGNIFICANT CHANGE UP (ref 2–14)
NEUTROPHILS # BLD AUTO: 6.5 K/UL — SIGNIFICANT CHANGE UP (ref 1.8–7.4)
NEUTROPHILS NFR BLD AUTO: 72 % — SIGNIFICANT CHANGE UP (ref 43–77)
PLATELET # BLD AUTO: 174 K/UL — SIGNIFICANT CHANGE UP (ref 150–400)
POTASSIUM SERPL-MCNC: 4.3 MMOL/L — SIGNIFICANT CHANGE UP (ref 3.5–5.3)
POTASSIUM SERPL-SCNC: 4.3 MMOL/L — SIGNIFICANT CHANGE UP (ref 3.5–5.3)
PROT SERPL-MCNC: 6.7 GM/DL — SIGNIFICANT CHANGE UP (ref 6–8.3)
PROTHROM AB SERPL-ACNC: 11.2 SEC — SIGNIFICANT CHANGE UP (ref 9.8–12.7)
RBC # BLD: 4.09 M/UL — SIGNIFICANT CHANGE UP (ref 3.8–5.2)
RBC # FLD: 12.6 % — SIGNIFICANT CHANGE UP (ref 10.3–14.5)
SODIUM SERPL-SCNC: 138 MMOL/L — SIGNIFICANT CHANGE UP (ref 135–145)
WBC # BLD: 9 K/UL — SIGNIFICANT CHANGE UP (ref 3.8–10.5)
WBC # FLD AUTO: 9 K/UL — SIGNIFICANT CHANGE UP (ref 3.8–10.5)

## 2017-09-05 PROCEDURE — 93971 EXTREMITY STUDY: CPT | Mod: 26,LT

## 2017-09-05 PROCEDURE — 99285 EMERGENCY DEPT VISIT HI MDM: CPT

## 2017-09-05 RX ORDER — ENOXAPARIN SODIUM 100 MG/ML
120 INJECTION SUBCUTANEOUS ONCE
Qty: 0 | Refills: 0 | Status: DISCONTINUED | OUTPATIENT
Start: 2017-09-05 | End: 2017-09-05

## 2017-09-05 RX ORDER — RIVAROXABAN 15 MG-20MG
15 KIT ORAL ONCE
Qty: 0 | Refills: 0 | Status: COMPLETED | OUTPATIENT
Start: 2017-09-05 | End: 2017-09-05

## 2017-09-05 RX ORDER — ENOXAPARIN SODIUM 100 MG/ML
60 INJECTION SUBCUTANEOUS ONCE
Qty: 0 | Refills: 0 | Status: DISCONTINUED | OUTPATIENT
Start: 2017-09-05 | End: 2017-09-05

## 2017-09-05 RX ORDER — RIVAROXABAN 15 MG-20MG
1 KIT ORAL
Qty: 42 | Refills: 0 | OUTPATIENT
Start: 2017-09-05 | End: 2017-09-26

## 2017-09-05 RX ORDER — ENOXAPARIN SODIUM 100 MG/ML
120 INJECTION SUBCUTANEOUS
Qty: 60 | Refills: 0 | OUTPATIENT
Start: 2017-09-05 | End: 2017-10-05

## 2017-09-05 RX ADMIN — RIVAROXABAN 15 MILLIGRAM(S): KIT at 16:14

## 2017-09-05 NOTE — ED PROVIDER NOTE - MEDICAL DECISION MAKING DETAILS
Pt sent in s/p LLE doppler showing DVT - will check coags, CBC, repeat doppler; if positive will require full AC, ideally lovenox given cancer history but pt hesitatnt to self-inject, will d/w social work regarding possibilities of novel AC vs coumadin vs lovenox if pt amenable Pt sent in s/p LLE doppler showing DVT - will check coags, CBC, repeat doppler; if positive will require full AC, ideally lovenox given cancer history but pt hesitatnt to self-inject, will d/w social work regarding possibilities of novel AC vs coumadin vs lovenox if pt amenable.  Decision to start on Xarelto as patient unwilling to inject lovenox, and does not want repeated blood work.  Okay for d/c home and f/u with PCP.

## 2017-09-05 NOTE — ED ADULT NURSE NOTE - OBJECTIVE STATEMENT
Pt alert and oriented x3. Pt presents with DVT in LLE. Pt hx of colon cancer. Pt had colostomy placed on RLQ a few weeks ago. Denies pain at this time. Daughter at bedside.

## 2017-09-05 NOTE — ED PROVIDER NOTE - PROGRESS NOTE DETAILS
ED attending Dr. Good seen and evaluated pt. Agree with resident assessment and plan. Pt uncomfortable with self-injection, did not receive lovenox in ED today, as cannot continue at home. Xarelto ordered, sent to pharmacy. Case management to help determine cost. Call placed to pt's PMD office, Dr. Okeefe ([phone]). Awaiting callback Call placed to pt's PMD office, Dr. Okeefe ([phone]). Fully updated regarding DVT and Xarelto. Will follow-up as outpt

## 2017-09-05 NOTE — ED PROVIDER NOTE - PHYSICAL EXAMINATION
Swelling left calf.  No signs of thrombophlebitis, or cellulitis.  No signs of trauma.  Colostomy in place, patent.  August Good DO.

## 2017-09-05 NOTE — ED PROVIDER NOTE - OBJECTIVE STATEMENT
72F recently dx Stg 4 colon Ca s/p partial colectomy w/ colostomy sent in after outpt doppler showed LLE DVT. Pt reported feeling worsening lower extremity swelling and pain with ambulation prompting study. Denies chest pain, sob, difficulty breathing, or palpitations. Pt had been on "blood thinning shots" when admitted but had not been discharged with these medicines. Pt has not yet started chemo, getting second opinion at [facility] prior to initiation.

## 2017-10-03 ENCOUNTER — MEDICATION RENEWAL (OUTPATIENT)
Age: 72
End: 2017-10-03

## 2017-10-03 RX ORDER — NYSTATIN AND TRIAMCINOLONE ACETONIDE 100000; 1 MG/G; MG/G
100000-0.1 CREAM TOPICAL TWICE DAILY
Qty: 60 | Refills: 3 | Status: ACTIVE | COMMUNITY
Start: 2017-10-03 | End: 1900-01-01

## 2017-10-12 ENCOUNTER — MEDICATION RENEWAL (OUTPATIENT)
Age: 72
End: 2017-10-12

## 2018-05-20 ENCOUNTER — FORM ENCOUNTER (OUTPATIENT)
Age: 73
End: 2018-05-20

## 2018-05-21 ENCOUNTER — APPOINTMENT (OUTPATIENT)
Dept: RADIOLOGY | Facility: CLINIC | Age: 73
End: 2018-05-21
Payer: MEDICARE

## 2018-05-21 ENCOUNTER — OUTPATIENT (OUTPATIENT)
Dept: OUTPATIENT SERVICES | Facility: HOSPITAL | Age: 73
LOS: 1 days | End: 2018-05-21
Payer: MEDICARE

## 2018-05-21 DIAGNOSIS — Z00.8 ENCOUNTER FOR OTHER GENERAL EXAMINATION: ICD-10-CM

## 2018-05-21 PROBLEM — L40.9 PSORIASIS, UNSPECIFIED: Chronic | Status: ACTIVE | Noted: 2017-08-19

## 2018-05-21 PROCEDURE — 77080 DXA BONE DENSITY AXIAL: CPT | Mod: 26

## 2018-05-21 PROCEDURE — 77080 DXA BONE DENSITY AXIAL: CPT

## 2018-08-15 NOTE — ED POST DISCHARGE NOTE - REASON FOR FOLLOW-UP
Radiology Follow-up DVT ppx: continue home Eliquis  no GI ppx indicated at this time    Clarify code status with patient and family in AM    Dispo: [facility] for PT

## 2018-11-01 ENCOUNTER — EMERGENCY (EMERGENCY)
Facility: HOSPITAL | Age: 73
LOS: 0 days | Discharge: HOME | End: 2018-11-01
Attending: EMERGENCY MEDICINE | Admitting: EMERGENCY MEDICINE
Payer: MEDICARE

## 2018-11-01 VITALS
OXYGEN SATURATION: 97 % | SYSTOLIC BLOOD PRESSURE: 139 MMHG | DIASTOLIC BLOOD PRESSURE: 76 MMHG | RESPIRATION RATE: 20 BRPM | TEMPERATURE: 98 F | HEART RATE: 82 BPM

## 2018-11-01 VITALS
OXYGEN SATURATION: 92 % | SYSTOLIC BLOOD PRESSURE: 120 MMHG | HEIGHT: 61 IN | RESPIRATION RATE: 18 BRPM | WEIGHT: 119.93 LBS | DIASTOLIC BLOOD PRESSURE: 69 MMHG | HEART RATE: 80 BPM

## 2018-11-01 DIAGNOSIS — M54.5 LOW BACK PAIN: ICD-10-CM

## 2018-11-01 DIAGNOSIS — F17.210 NICOTINE DEPENDENCE, CIGARETTES, UNCOMPLICATED: ICD-10-CM

## 2018-11-01 DIAGNOSIS — Z86.718 PERSONAL HISTORY OF OTHER VENOUS THROMBOSIS AND EMBOLISM: ICD-10-CM

## 2018-11-01 DIAGNOSIS — C18.9 MALIGNANT NEOPLASM OF COLON, UNSPECIFIED: ICD-10-CM

## 2018-11-01 DIAGNOSIS — Z79.01 LONG TERM (CURRENT) USE OF ANTICOAGULANTS: ICD-10-CM

## 2018-11-01 DIAGNOSIS — Z93.3 COLOSTOMY STATUS: ICD-10-CM

## 2018-11-01 LAB
ALBUMIN SERPL ELPH-MCNC: 3.3 G/DL — SIGNIFICANT CHANGE UP (ref 3.3–5)
ALP SERPL-CCNC: 117 U/L — SIGNIFICANT CHANGE UP (ref 40–120)
ALT FLD-CCNC: 25 U/L — SIGNIFICANT CHANGE UP (ref 12–78)
ANION GAP SERPL CALC-SCNC: 11 MMOL/L — SIGNIFICANT CHANGE UP (ref 5–17)
APPEARANCE UR: CLEAR — SIGNIFICANT CHANGE UP
APTT BLD: 28 SEC — SIGNIFICANT CHANGE UP (ref 27.5–36.3)
AST SERPL-CCNC: 19 U/L — SIGNIFICANT CHANGE UP (ref 15–37)
BACTERIA # UR AUTO: ABNORMAL
BASOPHILS # BLD AUTO: 0.03 K/UL — SIGNIFICANT CHANGE UP (ref 0–0.2)
BASOPHILS NFR BLD AUTO: 0.3 % — SIGNIFICANT CHANGE UP (ref 0–2)
BILIRUB SERPL-MCNC: 0.5 MG/DL — SIGNIFICANT CHANGE UP (ref 0.2–1.2)
BILIRUB UR-MCNC: NEGATIVE — SIGNIFICANT CHANGE UP
BUN SERPL-MCNC: 17 MG/DL — SIGNIFICANT CHANGE UP (ref 7–23)
CALCIUM SERPL-MCNC: 9.1 MG/DL — SIGNIFICANT CHANGE UP (ref 8.5–10.1)
CHLORIDE SERPL-SCNC: 101 MMOL/L — SIGNIFICANT CHANGE UP (ref 96–108)
CO2 SERPL-SCNC: 24 MMOL/L — SIGNIFICANT CHANGE UP (ref 22–31)
COLOR SPEC: YELLOW — SIGNIFICANT CHANGE UP
CREAT SERPL-MCNC: 0.76 MG/DL — SIGNIFICANT CHANGE UP (ref 0.5–1.3)
DIFF PNL FLD: ABNORMAL
EOSINOPHIL # BLD AUTO: 0.02 K/UL — SIGNIFICANT CHANGE UP (ref 0–0.5)
EOSINOPHIL NFR BLD AUTO: 0.2 % — SIGNIFICANT CHANGE UP (ref 0–6)
EPI CELLS # UR: SIGNIFICANT CHANGE UP
GLUCOSE SERPL-MCNC: 92 MG/DL — SIGNIFICANT CHANGE UP (ref 70–99)
GLUCOSE UR QL: NEGATIVE MG/DL — SIGNIFICANT CHANGE UP
HCT VFR BLD CALC: 38.8 % — SIGNIFICANT CHANGE UP (ref 34.5–45)
HGB BLD-MCNC: 13.4 G/DL — SIGNIFICANT CHANGE UP (ref 11.5–15.5)
IMM GRANULOCYTES NFR BLD AUTO: 0.3 % — SIGNIFICANT CHANGE UP (ref 0–1.5)
INR BLD: 1.18 RATIO — HIGH (ref 0.88–1.16)
KETONES UR-MCNC: ABNORMAL
LEUKOCYTE ESTERASE UR-ACNC: ABNORMAL
LIDOCAIN IGE QN: 82 U/L — SIGNIFICANT CHANGE UP (ref 73–393)
LYMPHOCYTES # BLD AUTO: 1.06 K/UL — SIGNIFICANT CHANGE UP (ref 1–3.3)
LYMPHOCYTES # BLD AUTO: 12 % — LOW (ref 13–44)
MCHC RBC-ENTMCNC: 32.8 PG — SIGNIFICANT CHANGE UP (ref 27–34)
MCHC RBC-ENTMCNC: 34.5 GM/DL — SIGNIFICANT CHANGE UP (ref 32–36)
MCV RBC AUTO: 94.9 FL — SIGNIFICANT CHANGE UP (ref 80–100)
MONOCYTES # BLD AUTO: 0.83 K/UL — SIGNIFICANT CHANGE UP (ref 0–0.9)
MONOCYTES NFR BLD AUTO: 9.4 % — SIGNIFICANT CHANGE UP (ref 2–14)
NEUTROPHILS # BLD AUTO: 6.84 K/UL — SIGNIFICANT CHANGE UP (ref 1.8–7.4)
NEUTROPHILS NFR BLD AUTO: 77.8 % — HIGH (ref 43–77)
NITRITE UR-MCNC: NEGATIVE — SIGNIFICANT CHANGE UP
NRBC # BLD: 0 /100 WBCS — SIGNIFICANT CHANGE UP (ref 0–0)
PH UR: 5 — SIGNIFICANT CHANGE UP (ref 5–8)
PLATELET # BLD AUTO: 314 K/UL — SIGNIFICANT CHANGE UP (ref 150–400)
POTASSIUM SERPL-MCNC: 4 MMOL/L — SIGNIFICANT CHANGE UP (ref 3.5–5.3)
POTASSIUM SERPL-SCNC: 4 MMOL/L — SIGNIFICANT CHANGE UP (ref 3.5–5.3)
PROT SERPL-MCNC: 7.2 GM/DL — SIGNIFICANT CHANGE UP (ref 6–8.3)
PROT UR-MCNC: 15 MG/DL
PROTHROM AB SERPL-ACNC: 13.2 SEC — HIGH (ref 10–12.9)
RBC # BLD: 4.09 M/UL — SIGNIFICANT CHANGE UP (ref 3.8–5.2)
RBC # FLD: 15 % — HIGH (ref 10.3–14.5)
RBC CASTS # UR COMP ASSIST: SIGNIFICANT CHANGE UP /HPF (ref 0–4)
SODIUM SERPL-SCNC: 136 MMOL/L — SIGNIFICANT CHANGE UP (ref 135–145)
SP GR SPEC: 1.02 — SIGNIFICANT CHANGE UP (ref 1.01–1.02)
UROBILINOGEN FLD QL: NEGATIVE MG/DL — SIGNIFICANT CHANGE UP
WBC # BLD: 8.81 K/UL — SIGNIFICANT CHANGE UP (ref 3.8–10.5)
WBC # FLD AUTO: 8.81 K/UL — SIGNIFICANT CHANGE UP (ref 3.8–10.5)
WBC UR QL: SIGNIFICANT CHANGE UP

## 2018-11-01 PROCEDURE — 99285 EMERGENCY DEPT VISIT HI MDM: CPT

## 2018-11-01 PROCEDURE — 72158 MRI LUMBAR SPINE W/O & W/DYE: CPT | Mod: 26

## 2018-11-01 PROCEDURE — 93010 ELECTROCARDIOGRAM REPORT: CPT

## 2018-11-01 RX ORDER — SODIUM CHLORIDE 9 MG/ML
3 INJECTION INTRAMUSCULAR; INTRAVENOUS; SUBCUTANEOUS ONCE
Qty: 0 | Refills: 0 | Status: COMPLETED | OUTPATIENT
Start: 2018-11-01 | End: 2018-11-01

## 2018-11-01 RX ORDER — HYDROMORPHONE HYDROCHLORIDE 2 MG/ML
2 INJECTION INTRAMUSCULAR; INTRAVENOUS; SUBCUTANEOUS ONCE
Qty: 0 | Refills: 0 | Status: DISCONTINUED | OUTPATIENT
Start: 2018-11-01 | End: 2018-11-01

## 2018-11-01 RX ORDER — SODIUM CHLORIDE 9 MG/ML
500 INJECTION INTRAMUSCULAR; INTRAVENOUS; SUBCUTANEOUS ONCE
Qty: 0 | Refills: 0 | Status: COMPLETED | OUTPATIENT
Start: 2018-11-01 | End: 2018-11-01

## 2018-11-01 RX ORDER — HYDROMORPHONE HYDROCHLORIDE 2 MG/ML
1 INJECTION INTRAMUSCULAR; INTRAVENOUS; SUBCUTANEOUS
Qty: 12 | Refills: 0 | OUTPATIENT
Start: 2018-11-01 | End: 2018-11-04

## 2018-11-01 RX ADMIN — SODIUM CHLORIDE 500 MILLILITER(S): 9 INJECTION INTRAMUSCULAR; INTRAVENOUS; SUBCUTANEOUS at 18:00

## 2018-11-01 RX ADMIN — HYDROMORPHONE HYDROCHLORIDE 2 MILLIGRAM(S): 2 INJECTION INTRAMUSCULAR; INTRAVENOUS; SUBCUTANEOUS at 20:55

## 2018-11-01 RX ADMIN — SODIUM CHLORIDE 3 MILLILITER(S): 9 INJECTION INTRAMUSCULAR; INTRAVENOUS; SUBCUTANEOUS at 16:58

## 2018-11-01 RX ADMIN — SODIUM CHLORIDE 500 MILLILITER(S): 9 INJECTION INTRAMUSCULAR; INTRAVENOUS; SUBCUTANEOUS at 16:58

## 2018-11-01 RX ADMIN — HYDROMORPHONE HYDROCHLORIDE 2 MILLIGRAM(S): 2 INJECTION INTRAMUSCULAR; INTRAVENOUS; SUBCUTANEOUS at 21:34

## 2018-11-01 NOTE — ED PROVIDER NOTE - SKIN, MLM
Skin normal color for race, warm, dry and intact. No evidence of rash.  no discoloration, tactile warmth or rash

## 2018-11-01 NOTE — ED ADULT NURSE NOTE - NSIMPLEMENTINTERV_GEN_ALL_ED
Implemented All Fall with Harm Risk Interventions:  [place] to call system. Call bell, personal items and telephone within reach. Instruct patient to call for assistance. Room bathroom lighting operational. Non-slip footwear when patient is off stretcher. Physically safe environment: no spills, clutter or unnecessary equipment. Stretcher in lowest position, wheels locked, appropriate side rails in place. Provide visual cue, wrist band, yellow gown, etc. Monitor gait and stability. Monitor for mental status changes and reorient to person, place, and time. Review medications for side effects contributing to fall risk. Reinforce activity limits and safety measures with patient and family. Provide visual clues: red socks.

## 2018-11-01 NOTE — ED PROVIDER NOTE - MUSCULOSKELETAL, MLM
Spine appears normal, range of motion is not limited. Bilateral SLR 45 degrees, bilateral distal LE normal distal motor exam. MAEx4  +mild lumbar midline TTP without step off deformity. +mild left lower posterior thoracic wall TTP. Thoracic wall stable.

## 2018-11-01 NOTE — ED PROVIDER NOTE - MEDICAL DECISION MAKING DETAILS
72 y/o female 72 y/o female, s/p partial colectomy/colostomy re: adv. colon CA, T5 nonacute vertebral fx, presents from Oncology office to ED c/o'ing 1 day significant LBP somewhat impairing ambulation, no known trauma.  No F/C nor neuro impairment.  Pt sent for MRI L spine.  Plan: labs, urine, MR Lspine.  Observe, reassess, ambulation trial if MRI negative.

## 2018-11-01 NOTE — ED PROVIDER NOTE - CONSTITUTIONAL, MLM
normal... elderly white female, alert, No respiratory distress, moderate discomfort due to pain, alert, oriented to person, place, time/situation

## 2018-11-01 NOTE — ED ADULT NURSE REASSESSMENT NOTE - NS ED NURSE REASSESS COMMENT FT1
pt now deciding to leave. was hesitant because dizzy after taking the dilaudid. explained to pt that is could be a side effect of the medication. gave pt water and placed back in room with daughter while they think about it. After about twenty minutes pt does decide to leave with daughter. pt was ambulatory earlier and safe when took pt to bathroom. pt will  pain meds sent to pharmacy tomorrow

## 2018-11-01 NOTE — ED PROVIDER NOTE - PROGRESS NOTE DETAILS
Dr. Ferraro:  Reevaluated patient at bedside.  Patient feeling improved, + self-ambulatory in ED w/ steady gait.  Discussed the results of all diagnostic testing in ED and copies of all reports given, incl. MR study.   An opportunity to ask questions was given.  Discussed the importance of prompt, close medical follow-up.  Patient will return with any changes, concerns or persistent / worsening symptoms.  Understanding of all instructions verbalized. Dr. Ferraro:  Reevaluated patient at bedside.  Patient feeling improved, + self-ambulatory in ED w/ steady gait.  Discussed the results of all diagnostic testing in ED and copies of all reports given, incl. MR study.   An opportunity to ask questions was given.  Pt agreeable w/ D/C home on po pain meds, close outpt f/u w/ PCP & Onc MD.  Discussed the importance of prompt, close medical follow-up.  Patient will return with any changes, concerns or persistent / worsening symptoms.  Understanding of all instructions verbalized.

## 2018-11-01 NOTE — ED PROVIDER NOTE - OBJECTIVE STATEMENT
74 y/o female with a PMHx of fracture of T5 x1 year, OA of spine and left hip, stage 4 colon CA with partial colectomy and colostomy bag, subsequent LLE DVT on Xarelto presents to the ED c/o abd pain. Pt reports yesterday she was having intermittent pain by her colostomy bag, now worsening. Pt notes she then began to have midline lumbar back pain starting yesterday with difficulty ambulating secondary to pain. She describes the pain as dull, strong and non radiating, exacerbated with walking and alleviated by lying down. Denies fevers, NVD, leg weakness, urinary/bowel incontinence, or decreased output in colostomy bag. Pt lives on her own. Pt states she has experienced similar abd pain in the back but it is more severe this time. Pt was seen at [facility] today and was sent to ED for severe back pain, was given 2mg Dilaudid PO before transferring pt to . Current smoker. PCP Dr. Okeefe. 72 y/o female with a PMHx of fracture of T5 x1 year, OA of spine and left hip, stage 4 colon CA with partial colectomy and colostomy bag, subsequent LLE DVT on Xarelto presents to the ED c/o LBP & abd pain. Pt reports yesterday she was having intermittent pain by her colostomy bag, now worsening, but that this is not unusual for her, + chronic intermittent mild abd discomfort same location. Pt notes she then began to have midline lumbar back pain starting yesterday with difficulty ambulating secondary to the back pain. She describes the pain as dull, strong and non radiating, exacerbated with walking and alleviated by lying down. Denies fevers, NVD, leg weakness, urinary/bowel incontinence, or decreased output in colostomy bag. Pt lives on her own.  Pt was seen at [facility] today and was sent to ED for severe back pain, was given 2mg Dilaudid PO before transferring pt to . Current smoker. PCP Dr. Okeefe.

## 2018-11-01 NOTE — ED ADULT NURSE NOTE - OBJECTIVE STATEMENT
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

## 2018-11-02 LAB
CULTURE RESULTS: NO GROWTH — SIGNIFICANT CHANGE UP
SPECIMEN SOURCE: SIGNIFICANT CHANGE UP

## 2018-11-05 ENCOUNTER — APPOINTMENT (OUTPATIENT)
Dept: INTERNAL MEDICINE | Facility: CLINIC | Age: 73
End: 2018-11-05
Payer: MEDICARE

## 2018-11-05 ENCOUNTER — RX RENEWAL (OUTPATIENT)
Age: 73
End: 2018-11-05

## 2018-11-05 VITALS
HEIGHT: 62 IN | RESPIRATION RATE: 20 BRPM | BODY MASS INDEX: 21.9 KG/M2 | OXYGEN SATURATION: 98 % | HEART RATE: 90 BPM | TEMPERATURE: 97.8 F | SYSTOLIC BLOOD PRESSURE: 100 MMHG | WEIGHT: 119 LBS | DIASTOLIC BLOOD PRESSURE: 70 MMHG

## 2018-11-05 DIAGNOSIS — M54.9 DORSALGIA, UNSPECIFIED: ICD-10-CM

## 2018-11-05 DIAGNOSIS — Z85.07 PERSONAL HISTORY OF MALIGNANT NEOPLASM OF PANCREAS: ICD-10-CM

## 2018-11-05 DIAGNOSIS — R82.90 UNSPECIFIED ABNORMAL FINDINGS IN URINE: ICD-10-CM

## 2018-11-05 PROCEDURE — 99496 TRANSJ CARE MGMT HIGH F2F 7D: CPT

## 2018-11-05 RX ORDER — NYSTATIN 100000 [USP'U]/G
100000 POWDER TOPICAL
Qty: 30 | Refills: 0 | Status: ACTIVE | COMMUNITY
Start: 2017-10-12 | End: 1900-01-01

## 2018-11-05 RX ORDER — HYDROMORPHONE HYDROCHLORIDE 2 MG/1
2 TABLET ORAL
Qty: 40 | Refills: 0 | Status: ACTIVE | COMMUNITY
Start: 2018-11-05 | End: 1900-01-01

## 2018-11-05 NOTE — ASSESSMENT
[FreeTextEntry1] : Acute low back pain.\par Current treatment for pancreatic cancer.\par S/p bowel resection, current colostomy bag.\par Abnormal urinalysis noted on review of hospital ED records.

## 2018-11-05 NOTE — PHYSICAL EXAM
[Well Developed] : well developed [Ill-Appearing] : ill-appearing [Normal Sclera/Conjunctiva] : normal sclera/conjunctiva [Supple] : supple [No Respiratory Distress] : no respiratory distress  [Clear to Auscultation] : lungs were clear to auscultation bilaterally [Normal Rate] : normal rate  [Regular Rhythm] : with a regular rhythm [Normal S1, S2] : normal S1 and S2 [No Edema] : there was no peripheral edema [Soft] : abdomen soft [Non Tender] : non-tender [No CVA Tenderness] : no CVA  tenderness [Grossly Normal Strength/Tone] : grossly normal strength/tone [No Rash] : no rash [Coordination Grossly Intact] : coordination grossly intact [Alert and Oriented x3] : oriented to person, place, and time [de-identified] : ostomy bag in place [de-identified] : paraspinal tenderness, low back, on right.

## 2018-11-05 NOTE — PLAN
[FreeTextEntry1] : Names for orthopedic and pain management provided to daughter.\par Renewed Dilaudid for pain for 10 days, Dilaudid 2 mg every 4 hours as needed for back pain.\par Send U/A C&S.\par Follow with Oncologist.\par Emergencies: Patient and daughter know to present to ED.

## 2018-11-05 NOTE — HISTORY OF PRESENT ILLNESS
[Family Member] : family member [FreeTextEntry8] : Patient presents in follow up from  ED visit 11/1/18.\par She complains of acute low back pain, concentrated right side, without radiation.  She has a history of fracture T5, OA of spine and left hip but never consulted with Orthopedist?  \par MRI of spine performed at hospital did not show metastatic disease, rather multiple spinal issues that were attributed to current pain.\par Has been taking Cilaudid and Advil for back pain providing partial and temporary relief.\par Partial colectomy and colostomy secondary to stage 4 pancreatic cancer.\par

## 2018-11-05 NOTE — REVIEW OF SYSTEMS
[Fatigue] : fatigue [Muscle Pain] : muscle pain [Back Pain] : back pain [Negative] : Heme/Lymph [Fever] : no fever [Chills] : no chills [Joint Pain] : no joint pain [Suicidal] : not suicidal [Insomnia] : no insomnia

## 2019-03-13 DIAGNOSIS — C78.7 MALIGNANT NEOPLASM OF COLON, UNSPECIFIED: ICD-10-CM

## 2019-03-13 DIAGNOSIS — C18.9 MALIGNANT NEOPLASM OF COLON, UNSPECIFIED: ICD-10-CM

## 2021-07-27 NOTE — ED STATDOCS - NS ED NOTE  TALK SOMEONE YN
No Render In Strict Bullet Format?: No Detail Level: Zone Continue Regimen: Triamcinolone PRN flares\\nCetaphil cream

## 2023-02-06 NOTE — DISCHARGE NOTE ADULT - CARE PROVIDER_API CALL
Detail Level: Detailed Was A Bandage Applied: Yes Punch Size In Mm: 8 Size Of Lesion In Cm (Optional): 0 Depth Of Punch Biopsy: dermis Biopsy Type: H and E Anesthesia Type: 1% lidocaine with epinephrine Anesthesia Volume In Cc (Will Not Render If 0): 0.5 Hemostasis: Electrocautery Epidermal Sutures: 3-0 Nylon Wound Care: Petrolatum Dressing: bandage Suture Removal: 14 days Patient Will Remove Sutures At Home?: No Lab: [id number] [facility] Path Notes (To The Dermatopathologist): Size: 8mm\\nR/O: Prurigo vs other Consent: Written consent was obtained and risks were reviewed including but not limited to scarring, infection, bleeding, scabbing, incomplete removal, nerve damage and allergy to anesthesia. Post-Care Instructions: I reviewed with the patient in detail post-care instructions. Patient is to keep the biopsy site dry overnight, and then apply bacitracin twice daily until healed. Patient may apply hydrogen peroxide soaks to remove any crusting. Curt Sneed), ColonRectal Surgery; Surgery  [address] [place] [address]  Phone: [phone]  Fax: [phone] Home Suture Removal Text: Patient was provided a home suture removal kit and will remove their sutures at home. If they have any questions or difficulties they will call the office. Notification Instructions: Patient will be notified of biopsy results. However, patient instructed to call the office if not contacted within 2 weeks. Billing Type: [employer] Information: Selecting Yes will display possible errors in your note based on the variables you have selected. This validation is only offered as a suggestion for you. PLEASE NOTE THAT THE VALIDATION TEXT WILL BE REMOVED WHEN YOU FINALIZE YOUR NOTE. IF YOU WANT TO FAX A PRELIMINARY NOTE YOU WILL NEED TO TOGGLE THIS TO 'NO' IF YOU DO NOT WANT IT IN YOUR FAXED NOTE. Curt Sneed), ColonRectal Surgery; Surgery  [address] [place] [address]  Phone: [phone]  Fax: [phone]    Franklin Desouza), Hematology; Internal Medicine; Medical Oncology  [address] [place] [address]  Phone: [phone]  Fax: [phone]

## 2025-05-21 NOTE — ED ADULT NURSE NOTE - EXTENSIONS OF SELF_ADULT
Prescription Strength Graduated Compression Stockings Recommendations: The patient was counseled that prescription strength graduated compression stockings should be worn for all waking hours. They will follow up with a venous specialist to monitor graduated compression stocking usage and their symptoms. Detail Level: Zone None